# Patient Record
Sex: FEMALE | Race: ASIAN | NOT HISPANIC OR LATINO | Employment: PART TIME | URBAN - METROPOLITAN AREA
[De-identification: names, ages, dates, MRNs, and addresses within clinical notes are randomized per-mention and may not be internally consistent; named-entity substitution may affect disease eponyms.]

---

## 2017-01-06 ENCOUNTER — OFFICE VISIT (OUTPATIENT)
Dept: INTERNAL MEDICINE | Facility: CLINIC | Age: 47
End: 2017-01-06

## 2017-01-06 ENCOUNTER — APPOINTMENT (OUTPATIENT)
Dept: WOMENS IMAGING | Facility: HOSPITAL | Age: 47
End: 2017-01-06

## 2017-01-06 VITALS
DIASTOLIC BLOOD PRESSURE: 72 MMHG | HEIGHT: 62 IN | SYSTOLIC BLOOD PRESSURE: 114 MMHG | WEIGHT: 198.8 LBS | BODY MASS INDEX: 36.58 KG/M2 | HEART RATE: 96 BPM

## 2017-01-06 DIAGNOSIS — J20.9 ACUTE BRONCHITIS, UNSPECIFIED ORGANISM: ICD-10-CM

## 2017-01-06 DIAGNOSIS — M54.50 RIGHT-SIDED LOW BACK PAIN WITHOUT SCIATICA, UNSPECIFIED CHRONICITY: Primary | ICD-10-CM

## 2017-01-06 DIAGNOSIS — K64.8 OTHER HEMORRHOIDS: ICD-10-CM

## 2017-01-06 PROCEDURE — 72110 X-RAY EXAM L-2 SPINE 4/>VWS: CPT | Performed by: INTERNAL MEDICINE

## 2017-01-06 PROCEDURE — 72110 X-RAY EXAM L-2 SPINE 4/>VWS: CPT | Performed by: RADIOLOGY

## 2017-01-06 PROCEDURE — 99215 OFFICE O/P EST HI 40 MIN: CPT | Performed by: INTERNAL MEDICINE

## 2017-01-06 RX ORDER — AZITHROMYCIN 250 MG/1
TABLET, FILM COATED ORAL
Qty: 6 TABLET | Refills: 0 | Status: SHIPPED | OUTPATIENT
Start: 2017-01-06 | End: 2017-03-03

## 2017-01-06 RX ORDER — BENZONATATE 200 MG/1
200 CAPSULE ORAL 3 TIMES DAILY PRN
Qty: 30 CAPSULE | Refills: 0 | Status: SHIPPED | OUTPATIENT
Start: 2017-01-06 | End: 2017-03-03

## 2017-01-06 RX ORDER — HYDROCORTISONE ACETATE 25 MG/1
25 SUPPOSITORY RECTAL 2 TIMES DAILY
Qty: 12 SUPPOSITORY | Refills: 2 | Status: SHIPPED | OUTPATIENT
Start: 2017-01-06 | End: 2017-03-03

## 2017-01-06 NOTE — MR AVS SNAPSHOT
Adria Nogueira   1/6/2017 2:30 PM   Office Visit    Dept Phone:  [phone]   Encounter #:  [id number]    Provider:  Ludmila King MD   Department:  [facility] INTERNAL MEDICINE                Your Full Care Plan              Today's Medication Changes          These changes are accurate as of: 1/6/17  4:04 PM.  If you have any questions, ask your nurse or doctor.               New Medication(s)Ordered:     hydrocortisone 25 MG suppository   Commonly known as:  ANUSOL-HC   Insert 1 suppository into the rectum 2 (Two) Times a Day.   Started by:  Ludmila King MD         Stop taking medication(s)listed here:     hydrocortisone-pramoxine 1-1 % rectal foam   Commonly known as:  PROCTOFOAM-HS   Stopped by:  Ludmila King MD           PROAIR RESPICLICK 108 (90 BASE) MCG/ACT inhaler   Generic drug:  albuterol   Stopped by:  Ludmila King MD                Where to Get Your Medications      These [facility] [address] [facility] [phone] [facility] [phone]   [address] [facility] , [place] [address]     Phone:  [phone]     azithromycin 250 MG tablet    benzonatate 200 MG capsule    hydrocortisone 25 MG suppository                  Your Updated Medication List          This list is accurate as of: 1/6/17  4:04 PM.  Always use your most recent med list.                acetaminophen 100 MG/ML solution   Commonly known as:  TYLENOL       azithromycin 250 MG tablet   Commonly known as:  ZITHROMAX Z-NAM   Take 2 tablets the first day, then 1 tablet daily for 4 days.       benzonatate 200 MG capsule   Commonly known as:  TESSALON   Take 1 capsule by mouth 3 (Three) Times a Day As Needed for cough.       hydrocortisone 25 MG suppository   Commonly known as:  ANUSOL-HC   Insert 1 suppository into the rectum 2 (Two) Times a Day.               We Performed the Following     Ambulatory Referral to  Colorectal Surgery     Ambulatory Referral to Physical Therapy Evaluate and treat     XR Spine Lumbar 4+ View (In Office)       You Were Diagnosed With        Codes Comments    Right-sided low back pain without sciatica, unspecified chronicity    -  Primary ICD-10-CM: M54.5  ICD-9-CM: 724.2     Other hemorrhoids     ICD-10-CM: K64.8  ICD-9-CM: 455.6     Acute bronchitis, unspecified organism     ICD-10-CM: J20.9  ICD-9-CM: 466.0       Instructions     None    Patient Instructions History      Upcoming Appointments     Visit Type Date Time Department    NEW PATIENT 2017  2:30 PM [facility]    FOLLOW UP 2017  2:45 PM [facility] Signup     [employer] allows you to send messages to your doctor, view your test results, renew your prescriptions, schedule appointments, and more. To sign up, go to [employer] and click on the Sign Up Now link in the New User? box. Enter your WhoKnows Activation Code exactly as it appears below along with the last four digits of your Social Security Number and your Date of Birth () to complete the sign-up process. If you do not sign up before the expiration date, you must request a new code.    WhoKnows Activation Code: [email or web address]  Expires: 2017  4:04 PM    If you have questions, you can email Home Delivery Service [email or web address] or call [phone] to talk to our WhoKnows staff. Remember, WhoKnows is NOT to be used for urgent needs. For medical emergencies, dial 911.               Other Info from Your Visit           Your Appointments     2017  2:45 PM EST   Follow Up with Ludmila King MD   [facility] (--)    [address]  [facility] [address] [phone]           Arrive 15 minutes prior to appointment.              Allergies     Aspirin  Nausea Only    Ibuprofen  Nausea Only      Reason for Visit     Establish Care est care      Vital Signs     Blood Pressure Pulse  "Height Weight Last Menstrual Period Body Mass Index    114/72 96 62\" (157.5 cm) 198 lb 12.8 oz (90.2 kg) 12/27/2016 36.36 kg/m2    Smoking Status                   Never Smoker           Problems and Diagnoses Noted     Low back pain    -  Primary    Other hemorrhoids        Acute bronchitis          Results     XR Spine Lumbar 4+ View (In Office)               "

## 2017-01-06 NOTE — PROGRESS NOTES
Chief Complaint   Patient presents with   • Establish Care     est care        Subjective   Adria Nogueira is a 46 y.o. female who is here to establish care.  She has several problems she would like to address.  This includes back pain, cough and hemorrhoids.    HPI: back pain:She has been having problems with her back aching for about 3-4 months.  It's located in the right side of her low back.  One day it was so severe that she couldn't walk and it made her cry.  She does not recall any precipitating event.  She cannot think of any specific aggravating factors or relieving factors.  Her back pain has awakened her from sleep at night.  She denies fevers, no rashes no urinary symptoms, no problems with bowel movements.  She does not think she's had weakness in her legs.     Cough: This is been going on for several days. It is nonproductive.  She feels some congestion in her chest and nasal passages.  Her voice is different.  She does not have a fever associated with this.  No loss of appetite.  No nausea, vomiting or diarrhea.  No earache.  No sore throat.  No sinus pain.    Hemorrhoids: She has ongoing problems with hemorrhoids. It hurts and burns in the rectal area.  They bleed at times.  She has problems inserting Proctofoam because of the discomfort. She also has history of anal fissure.      The following portions of the patient's history were reviewed and updated as appropriate: allergies, current medications, past social history, problem list, past surgical history    Review of Systems   Constitutional: Negative for appetite change, chills, fatigue and fever.   HENT: Positive for congestion. Negative for ear pain, sinus pressure, sneezing, sore throat, tinnitus, trouble swallowing and voice change.    Eyes: Negative for visual disturbance.   Respiratory: Positive for cough. Negative for shortness of breath.    Cardiovascular: Negative for chest pain, palpitations and leg swelling.   Gastrointestinal:  "Positive for anal bleeding and rectal pain. Negative for abdominal pain, constipation, diarrhea, nausea and vomiting.   Endocrine: Negative for cold intolerance, heat intolerance, polydipsia and polyuria.   Genitourinary: Negative for dysuria and frequency.   Musculoskeletal: Positive for back pain and gait problem. Negative for arthralgias.   Skin: Negative for rash.   Neurological: Negative for dizziness, syncope and headaches.   Hematological: Negative for adenopathy. Does not bruise/bleed easily.   Psychiatric/Behavioral: Negative for decreased concentration, dysphoric mood and sleep disturbance. The patient is not nervous/anxious.        Visit Vitals   • /72   • Pulse 96   • Ht 62\" (157.5 cm)   • Wt 198 lb 12.8 oz (90.2 kg)   • LMP 12/27/2016   • BMI 36.36 kg/m2        Objective   Physical Exam   Constitutional: She is oriented to person, place, and time. She appears well-developed and well-nourished. No distress.   HENT:   Right Ear: Tympanic membrane and ear canal normal.   Left Ear: Tympanic membrane and ear canal normal.   Nose: Right sinus exhibits no maxillary sinus tenderness and no frontal sinus tenderness. Left sinus exhibits no maxillary sinus tenderness and no frontal sinus tenderness.   Mouth/Throat: Oropharynx is clear and moist. No oropharyngeal exudate or posterior oropharyngeal edema.   Eyes: Conjunctivae are normal.   Neck: Neck supple. Normal carotid pulses present. Carotid bruit is not present.   Cardiovascular: Normal rate, regular rhythm, S1 normal, S2 normal and intact distal pulses.  Exam reveals no gallop and no friction rub.    No murmur heard.  Pulses:       Carotid pulses are 2+ on the right side, and 2+ on the left side.  Pulmonary/Chest: Effort normal and breath sounds normal. No respiratory distress. She has no wheezes. She has no rales. Chest wall is not dull to percussion.   Dry cough present   Genitourinary:   Genitourinary Comments: External hemorrhoids present.  Rectal " exam attempted but was very uncomfortable for her and not completed.   Musculoskeletal: She exhibits no edema.   There is no pain to palpation of her lumbar spine.  Straight leg raising of the right leg brings out some discomfort in her right low back.  There is no pain with straight leg raising of the left leg.  Patellar reflexes are 1+ and equal.  Lower extremity strength 5 over 5 bilaterally.   Lymphadenopathy:     She has no cervical adenopathy.   Neurological: She is alert and oriented to person, place, and time.   Skin: Skin is warm and dry.   Nursing note and vitals reviewed.      Assessment/Plan   Problem List Items Addressed This Visit     None      Visit Diagnoses     Right-sided low back pain without sciatica, unspecified chronicity    -  Primary    Relevant Orders    XR Spine Lumbar 4+ View (In Office) (Completed)    Ambulatory Referral to Physical Therapy Evaluate and treat    Other hemorrhoids        Relevant Medications    hydrocortisone (ANUSOL-HC) 25 MG suppository    Other Relevant Orders    Ambulatory Referral to Colorectal Surgery    Acute bronchitis, unspecified organism        Relevant Medications    azithromycin (ZITHROMAX Z-NAM) 250 MG tablet    benzonatate (TESSALON) 200 MG capsule      40-45 minutes spent with her.  For back pain,  She will have x-rays today and will try physical therapy.  She may also take Tylenol or over-the-counter NSAID.  For hemorrhoids and rectal pain, we are going to try Anusol HC Suppository.  Also referral to colorectal surgery. For acute bronchitis, she will take azithromycin.  Also benzonatate as needed.  We discussed using an inhaler.  She does not think she needs an inhaler.  Return in 6 weeks for follow-up, check fasting blood work at that time.

## 2017-03-03 ENCOUNTER — OFFICE VISIT (OUTPATIENT)
Dept: INTERNAL MEDICINE | Facility: CLINIC | Age: 47
End: 2017-03-03

## 2017-03-03 VITALS
SYSTOLIC BLOOD PRESSURE: 112 MMHG | DIASTOLIC BLOOD PRESSURE: 70 MMHG | HEIGHT: 62 IN | RESPIRATION RATE: 14 BRPM | WEIGHT: 197 LBS | BODY MASS INDEX: 36.25 KG/M2

## 2017-03-03 DIAGNOSIS — M79.602 LEFT ARM PAIN: ICD-10-CM

## 2017-03-03 DIAGNOSIS — R07.89 CHEST DISCOMFORT: ICD-10-CM

## 2017-03-03 DIAGNOSIS — E78.5 HYPERLIPIDEMIA, UNSPECIFIED HYPERLIPIDEMIA TYPE: Primary | ICD-10-CM

## 2017-03-03 LAB
BACTERIA UR QL AUTO: ABNORMAL /HPF
BASOPHILS # BLD AUTO: 0.03 10*3/MM3 (ref 0–0.2)
BASOPHILS NFR BLD AUTO: 0.3 % (ref 0–1.5)
BILIRUB UR QL STRIP: NEGATIVE
CLARITY UR: CLEAR
COLOR UR: YELLOW
EOSINOPHIL # BLD AUTO: 0.12 10*3/MM3 (ref 0–0.7)
EOSINOPHIL # BLD AUTO: 1.3 % (ref 0.3–6.2)
ERYTHROCYTE [DISTWIDTH] IN BLOOD BY AUTOMATED COUNT: 16 % (ref 11.7–13)
GLUCOSE UR STRIP-MCNC: NEGATIVE MG/DL
HCT VFR BLD AUTO: 34.9 % (ref 35.6–45.5)
HGB BLD-MCNC: 11.1 G/DL (ref 11.9–15.5)
HGB UR QL STRIP.AUTO: ABNORMAL
HYALINE CASTS UR QL AUTO: ABNORMAL /LPF
IMM GRANULOCYTES # BLD: 0 10*3/MM3 (ref 0–0.03)
IMM GRANULOCYTES NFR BLD: 0 % (ref 0–0.5)
KETONES UR QL STRIP: NEGATIVE
LEUKOCYTE ESTERASE UR QL STRIP.AUTO: NEGATIVE
LYMPHOCYTES # BLD AUTO: 3.18 10*3/MM3 (ref 0.9–4.8)
LYMPHOCYTES NFR BLD AUTO: 35.3 % (ref 19.6–45.3)
MCH RBC QN AUTO: 26.6 PG (ref 26.9–32)
MCHC RBC AUTO-ENTMCNC: 31.8 G/DL (ref 32.4–36.3)
MCV RBC AUTO: 83.5 FL (ref 80.5–98.2)
MONOCYTES # BLD AUTO: 0.75 10*3/MM3 (ref 0.2–1.2)
MONOCYTES NFR BLD AUTO: 8.3 % (ref 5–12)
NEUTROPHILS # BLD AUTO: 4.94 10*3/MM3 (ref 1.9–8.1)
NEUTROPHILS NFR BLD AUTO: 54.8 % (ref 42.7–76)
NITRITE UR QL STRIP: NEGATIVE
PH UR STRIP.AUTO: 7 [PH] (ref 5–8)
PLATELET # BLD AUTO: 323 10*3/MM3 (ref 140–500)
PROT UR QL STRIP: NEGATIVE
RBC # BLD AUTO: 4.18 10*6/MM3 (ref 3.9–5.2)
RBC # UR: ABNORMAL /HPF
REF LAB TEST METHOD: ABNORMAL
SP GR UR STRIP: 1.02 (ref 1–1.03)
SQUAMOUS #/AREA URNS HPF: ABNORMAL /HPF
UROBILINOGEN UR QL STRIP: ABNORMAL
WBC # BLD AUTO: 9.02 10*3/MM3 (ref 4.5–10.7)
WBC UR QL AUTO: ABNORMAL /HPF

## 2017-03-03 PROCEDURE — 81001 URINALYSIS AUTO W/SCOPE: CPT | Performed by: INTERNAL MEDICINE

## 2017-03-03 PROCEDURE — 99214 OFFICE O/P EST MOD 30 MIN: CPT | Performed by: INTERNAL MEDICINE

## 2017-03-03 PROCEDURE — 80061 LIPID PANEL: CPT | Performed by: INTERNAL MEDICINE

## 2017-03-03 PROCEDURE — 93000 ELECTROCARDIOGRAM COMPLETE: CPT | Performed by: INTERNAL MEDICINE

## 2017-03-03 PROCEDURE — 80053 COMPREHEN METABOLIC PANEL: CPT | Performed by: INTERNAL MEDICINE

## 2017-03-03 NOTE — PROGRESS NOTES
"Chief Complaint   Patient presents with   • Back Pain     6 week follow up         Subjective   Adria Nogueira is a 46 y.o. female     HPI: She comes in for follow-up of back pain.  She had an x-ray which showed mild arthritis.  She is not having problems so much with her back.  She is having problems with her neck and left arm.  She is somewhat vague as to when her symptoms first began.  However she states that for about 10 days to 2 weeks she had pain starting from her left neck area going down her arm to her fingers.  This kind like it achy pain.  She did not feel well with it.  There is some discomfort in her left upper chest.  She describes some shortness of breath.  She states that it was hard to use her left arm because it hurt to raise it.  However now, she does not have constant pain.  She does not recall any specific precipitating, aggravating or relieving factors.  She states that she was able to continue her daily activities despite the pain.          She used anusol suppositories for rectal pain.  It has resolved. She is following a high fiber diet.     The following portions of the patient's history were reviewed and updated as appropriate: allergies, current medications, past social history, problem list, past surgical history    Review of Systems   Constitutional: Negative for activity change and appetite change.   HENT: Negative for nosebleeds.    Eyes: Negative for visual disturbance.   Respiratory: Positive for shortness of breath. Negative for cough.    Cardiovascular: Positive for chest pain. Negative for palpitations and leg swelling.        Left arm pain, chest pain   Neurological: Negative for headaches.       Visit Vitals   • /70 (BP Location: Right arm, Patient Position: Sitting, Cuff Size: Adult)   • Resp 14   • Ht 62\" (157.5 cm)   • Wt 197 lb (89.4 kg)   • BMI 36.03 kg/m2        Objective    Physical Exam   Constitutional: She is oriented to person, place, and time. She appears " well-developed and well-nourished. No distress.   Neck: Normal carotid pulses and no JVD present. Carotid bruit is not present.   Cardiovascular: Normal rate, regular rhythm, S1 normal, S2 normal and intact distal pulses.  Exam reveals no gallop and no friction rub.    No murmur heard.  Pulses:       Carotid pulses are 2+ on the right side, and 2+ on the left side.  Pulmonary/Chest: Effort normal and breath sounds normal. No respiratory distress. She has no wheezes. She has no rhonchi. She has no rales. Chest wall is not dull to percussion.   Musculoskeletal: She exhibits no edema.   Neck flexion and extension causes some discomfort in her left neck.  Range of motion of her left shoulder is somewhat limited.  She finds it difficult touch the small of her back.   Neurological: She is alert and oriented to person, place, and time.   Skin: Skin is warm and dry.   Nursing note and vitals reviewed.      ECG 12 Lead  Date/Time: 3/3/2017 4:32 PM  Performed by: LIZBETH LAND  Authorized by: PHILIPPE SOSA   Previous ECG: no previous ECG available  Rhythm: sinus rhythm  Rate: normal  BPM: 71  Conduction: conduction normal  ST Segments: ST segments normal  T Waves: T waves normal  Other findings comments: Low QRS voltages precordial leads  Clinical impression: non-specific ECG            Assessment/Plan   Problem List Items Addressed This Visit        Cardiovascular and Mediastinum    HLD (hyperlipidemia) - Primary    Relevant Orders    Comprehensive Metabolic Panel    CBC & Differential    Urinalysis With / Microscopic If Indicated (Completed)    Lipid Panel    CBC Auto Differential    Urinalysis, Microscopic Only       Nervous and Auditory    Chest discomfort    Relevant Orders    Treadmill Stress Test      Other Visit Diagnoses     Left arm pain        Relevant Orders    Treadmill Stress Test        We discussed her left arm pain.  With also symptoms of chest pain and shortness of breath, I have recommended she do a  treadmill stress test.  If negative, we can pursue physical therapy for her neck and left arm discomfort.  She has mild arthritis in her lumbar spine on plain x-rays and she may also have arthritis in her neck.

## 2017-03-06 LAB
ALBUMIN SERPL-MCNC: 3.64 G/DL (ref 3.4–4.6)
ALBUMIN/GLOB SERPL: 1.1 G/DL
ALP SERPL-CCNC: 71 U/L (ref 46–116)
ALT SERPL W P-5'-P-CCNC: 27 U/L (ref 14–59)
ANION GAP SERPL CALCULATED.3IONS-SCNC: 7 MMOL/L
AST SERPL-CCNC: 21 U/L (ref 7–37)
BILIRUB SERPL-MCNC: 0.1 MG/DL (ref 0.2–1)
BUN BLD-MCNC: 13 MG/DL (ref 6–22)
BUN/CREAT SERPL: 18.6 (ref 7–25)
CALCIUM SPEC-SCNC: 8.7 MG/DL (ref 8.6–10.5)
CHLORIDE SERPL-SCNC: 103 MMOL/L (ref 95–107)
CHOLEST SERPL-MCNC: 240 MG/DL (ref 0–200)
CO2 SERPL-SCNC: 30 MMOL/L (ref 23–32)
CREAT BLD-MCNC: 0.7 MG/DL (ref 0.55–1.02)
GFR SERPL CREATININE-BSD FRML MDRD: 108 ML/MIN/1.73
GFR SERPL CREATININE-BSD FRML MDRD: 89 ML/MIN/1.73
GLOBULIN UR ELPH-MCNC: 3.5 GM/DL
GLUCOSE BLD-MCNC: 89 MG/DL (ref 70–100)
HDLC SERPL-MCNC: 57 MG/DL (ref 40–81)
LDLC SERPL CALC-MCNC: 154 MG/DL (ref 0–100)
LDLC/HDLC SERPL: 2.71 {RATIO}
POTASSIUM BLD-SCNC: 4.1 MMOL/L (ref 3.3–5.3)
PROT SERPL-MCNC: 7.1 G/DL (ref 6.3–8.4)
SODIUM BLD-SCNC: 140 MMOL/L (ref 136–145)
TRIGL SERPL-MCNC: 143 MG/DL (ref 0–150)
VLDLC SERPL-MCNC: 28.6 MG/DL

## 2017-03-07 ENCOUNTER — LAB (OUTPATIENT)
Dept: INTERNAL MEDICINE | Facility: CLINIC | Age: 47
End: 2017-03-07

## 2017-03-07 ENCOUNTER — TELEPHONE (OUTPATIENT)
Dept: INTERNAL MEDICINE | Facility: CLINIC | Age: 47
End: 2017-03-07

## 2017-03-07 DIAGNOSIS — D64.9 ANEMIA, UNSPECIFIED: Primary | ICD-10-CM

## 2017-03-07 NOTE — TELEPHONE ENCOUNTER
----- Message from Ludmila King MD sent at 3/6/2017  6:03 PM EST -----  Regarding: add lab?  Can ferritin, iron profile, B12 and folate be added to recent lab?  Diagnosis is anemia

## 2017-03-08 LAB
FERRITIN SERPL-MCNC: 7 NG/ML (ref 15–150)
FOLATE SERPL-MCNC: 16.1 NG/ML
IRON SATN MFR SERPL: 6 % (ref 15–55)
IRON SERPL-MCNC: 26 UG/DL (ref 27–159)
LOPINAVIR ISLT PHENOTYP: 432 UG/DL (ref 131–425)
TIBC SERPL-MCNC: 458 UG/DL (ref 250–450)
VIT B12 SERPL-MCNC: 295 PG/ML (ref 211–946)

## 2017-12-28 ENCOUNTER — OFFICE VISIT (OUTPATIENT)
Dept: INTERNAL MEDICINE | Facility: CLINIC | Age: 47
End: 2017-12-28

## 2017-12-28 VITALS
WEIGHT: 192 LBS | TEMPERATURE: 98.7 F | DIASTOLIC BLOOD PRESSURE: 98 MMHG | BODY MASS INDEX: 35.33 KG/M2 | HEIGHT: 62 IN | SYSTOLIC BLOOD PRESSURE: 148 MMHG

## 2017-12-28 DIAGNOSIS — R68.89 FLU-LIKE SYMPTOMS: Primary | ICD-10-CM

## 2017-12-28 LAB
EXPIRATION DATE: ABNORMAL
FLUAV AG NPH QL: NEGATIVE
FLUBV AG NPH QL: POSITIVE
INTERNAL CONTROL: ABNORMAL
Lab: ABNORMAL

## 2017-12-28 PROCEDURE — 99213 OFFICE O/P EST LOW 20 MIN: CPT | Performed by: INTERNAL MEDICINE

## 2017-12-28 PROCEDURE — 87804 INFLUENZA ASSAY W/OPTIC: CPT | Performed by: INTERNAL MEDICINE

## 2017-12-28 RX ORDER — OSELTAMIVIR PHOSPHATE 75 MG/1
75 CAPSULE ORAL 2 TIMES DAILY
Qty: 10 CAPSULE | Refills: 0 | Status: SHIPPED | OUTPATIENT
Start: 2017-12-28 | End: 2018-08-17

## 2017-12-28 RX ORDER — DEXTROMETHORPHAN HYDROBROMIDE AND PROMETHAZINE HYDROCHLORIDE 15; 6.25 MG/5ML; MG/5ML
5 SYRUP ORAL 4 TIMES DAILY PRN
Qty: 180 ML | Refills: 1 | Status: SHIPPED | OUTPATIENT
Start: 2017-12-28 | End: 2018-08-17

## 2017-12-28 NOTE — PATIENT INSTRUCTIONS
"Influenza, Adult  Influenza, more commonly known as \"the flu,\" is a viral infection that primarily affects the respiratory tract. The respiratory tract includes organs that help you breathe, such as the lungs, nose, and throat. The flu causes many common cold symptoms, as well as a high fever and body aches.  The flu spreads easily from person to person (is contagious). Getting a flu shot (influenza vaccination) every year is the best way to prevent influenza.  CAUSES  Influenza is caused by a virus. You can catch the virus by:  · Breathing in droplets from an infected person's cough or sneeze.  · Touching something that was recently contaminated with the virus and then touching your mouth, nose, or eyes.  RISK FACTORS  The following factors may make you more likely to get the flu:  · Not cleaning your hands frequently with soap and water or alcohol-based hand .  · Having close contact with many people during cold and flu season.  · Touching your mouth, eyes, or nose without washing or sanitizing your hands first.  · Not drinking enough fluids or not eating a healthy diet.  · Not getting enough sleep or exercise.  · Being under a high amount of stress.  · Not getting a yearly (annual) flu shot.  You may be at a higher risk of complications from the flu, such as a severe lung infection (pneumonia), if you:  · Are over the age of 65.  · Are pregnant.  · Have a weakened disease-fighting system (immune system). You may have a weakened immune system if you:    Have HIV or AIDS.    Are undergoing chemotherapy.    Are taking medicines that reduce the activity of (suppress) the immune system.  · Have a long-term (chronic) illness, such as heart disease, kidney disease, diabetes, or lung disease.  · Have a liver disorder.  · Are obese.  · Have anemia.  SYMPTOMS  Symptoms of this condition typically last 4-10 days and may include:  · Fever.  · Chills.  · Headache, body aches, or muscle aches.  · Sore " throat.  · Cough.  · Runny or congested nose.  · Chest discomfort and cough.  · Poor appetite.  · Weakness or tiredness (fatigue).  · Dizziness.  · Nausea or vomiting.  DIAGNOSIS  This condition may be diagnosed based on your medical history and a physical exam. Your health care provider may do a nose or throat swab test to confirm the diagnosis.  TREATMENT  If influenza is detected early, you can be treated with antiviral medicine that can reduce the length of your illness and the severity of your symptoms. This medicine may be given by mouth (orally) or through an IV tube that is inserted in one of your veins.  The goal of treatment is to relieve symptoms by taking care of yourself at home. This may include taking over-the-counter medicines, drinking plenty of fluids, and adding humidity to the air in your home.  In some cases, influenza goes away on its own. Severe influenza or complications from influenza may be treated in a hospital.  HOME CARE INSTRUCTIONS  · Take over-the-counter and prescription medicines only as told by your health care provider.  · Use a cool mist humidifier to add humidity to the air in your home. This can make breathing easier.  · Rest as needed.  · Drink enough fluid to keep your urine clear or pale yellow.  · Cover your mouth and nose when you cough or sneeze.  · Wash your hands with soap and water often, especially after you cough or sneeze. If soap and water are not available, use hand .  · Stay home from work or school as told by your health care provider. Unless you are visiting your health care provider, try to avoid leaving home until your fever has been gone for 24 hours without the use of medicine.  · Keep all follow-up visits as told by your health care provider. This is important.  PREVENTION  · Getting an annual flu shot is the best way to avoid getting the flu. You may get the flu shot in late summer, fall, or winter. Ask your health care provider when you should  get your flu shot.  · Wash your hands often or use hand  often.  · Avoid contact with people who are sick during cold and flu season.  · Eat a healthy diet, drink plenty of fluids, get enough sleep, and exercise regularly.  SEEK MEDICAL CARE IF:  · You develop new symptoms.  · You have:    Chest pain.    Diarrhea.    A fever.  · Your cough gets worse.  · You produce more mucus.  · You feel nauseous or you vomit.  SEEK IMMEDIATE MEDICAL CARE IF:  · You develop shortness of breath or difficulty breathing.  · Your skin or nails turn a bluish color.  · You have severe pain or stiffness in your neck.  · You develop a sudden headache or sudden pain in your face or ear.  · You cannot stop vomiting.     This information is not intended to replace advice given to you by your health care provider. Make sure you discuss any questions you have with your health care provider.     Document Released: 12/15/2001 Document Revised: 04/10/2017 Document Reviewed: 10/11/2016  [email or web address] Interactive Patient Education ©2017 [employer]

## 2017-12-28 NOTE — PROGRESS NOTES
"Chief Complaint   Patient presents with   • Cough     cough, body aches   • Fever   • Headache        Subjective   Adria Nogueira is a 47 y.o. female     HPI:   She comes in for evaluation of respiratory symptoms. These began acutely. She has had them for two days.  They are persistent and seem to be worsening.  She has a cough, body aches, she has felt feverish. Her throat hurts. She has not had sputum production, earache, nasal congestion although she does have a runny nose. Her sinuses do not hurt. Associated symptoms: loss of appetite.         The following portions of the patient's history were reviewed and updated as appropriate: allergies, current medications, past social history, problem list, past surgical history    Review of Systems   Constitutional: Positive for appetite change, chills, fatigue and fever. Negative for diaphoresis.   Respiratory:        Chest burns and hurts when she coughs   Gastrointestinal: Positive for nausea. Negative for diarrhea and vomiting.   Neurological: Positive for headaches.   Hematological: Negative for adenopathy.           Objective     /98  Temp 98.7 °F (37.1 °C)  Ht 157.5 cm (62\")  Wt 87.1 kg (192 lb)  BMI 35.12 kg/m2     Physical Exam   Constitutional: She appears well-developed and well-nourished.   Appears ill   HENT:   Right Ear: Tympanic membrane normal.   Left Ear: Tympanic membrane is retracted. Tympanic membrane is not erythematous.   Nose: Right sinus exhibits no maxillary sinus tenderness and no frontal sinus tenderness. Left sinus exhibits no maxillary sinus tenderness and no frontal sinus tenderness.   Mouth/Throat: Oropharynx is clear and moist.   Cardiovascular: Normal rate, regular rhythm, S1 normal and S2 normal.  Exam reveals no gallop and no friction rub.    No murmur heard.  Pulmonary/Chest: Effort normal and breath sounds normal. She has no wheezes. She has no rhonchi. She has no rales.       Assessment/Plan   Problem List Items Addressed " This Visit     None      Visit Diagnoses     Flu-like symptoms    -  Primary    Relevant Orders    POCT Influenza A/B        She tests positive for influenza B.  Will treat with Tamiflu, promethazine dm.

## 2018-08-17 ENCOUNTER — APPOINTMENT (OUTPATIENT)
Dept: WOMENS IMAGING | Facility: HOSPITAL | Age: 48
End: 2018-08-17

## 2018-08-17 ENCOUNTER — OFFICE VISIT (OUTPATIENT)
Dept: INTERNAL MEDICINE | Facility: CLINIC | Age: 48
End: 2018-08-17

## 2018-08-17 VITALS
WEIGHT: 199 LBS | BODY MASS INDEX: 36.62 KG/M2 | HEIGHT: 62 IN | DIASTOLIC BLOOD PRESSURE: 66 MMHG | SYSTOLIC BLOOD PRESSURE: 104 MMHG

## 2018-08-17 DIAGNOSIS — M25.571 RIGHT ANKLE PAIN, UNSPECIFIED CHRONICITY: Primary | ICD-10-CM

## 2018-08-17 DIAGNOSIS — R31.29 MICROSCOPIC HEMATURIA: ICD-10-CM

## 2018-08-17 DIAGNOSIS — M25.512 LEFT SHOULDER PAIN, UNSPECIFIED CHRONICITY: ICD-10-CM

## 2018-08-17 PROCEDURE — 73030 X-RAY EXAM OF SHOULDER: CPT | Performed by: RADIOLOGY

## 2018-08-17 PROCEDURE — 99213 OFFICE O/P EST LOW 20 MIN: CPT | Performed by: INTERNAL MEDICINE

## 2018-08-17 PROCEDURE — 73610 X-RAY EXAM OF ANKLE: CPT | Performed by: RADIOLOGY

## 2018-08-17 PROCEDURE — 73610 X-RAY EXAM OF ANKLE: CPT | Performed by: INTERNAL MEDICINE

## 2018-08-17 PROCEDURE — 73030 X-RAY EXAM OF SHOULDER: CPT | Performed by: INTERNAL MEDICINE

## 2018-08-17 NOTE — PROGRESS NOTES
Chief Complaint   Patient presents with   • Leg Pain     lower back down right leg shooting pains at times   • Shoulder Pain     left shoulder pain   • Ankle Pain     right ankle pain       Subjective   Adria Nogueira is a 48 y.o. female.     History of Present Illness       Left shoulder pain: This is a new problem.  About one month ago she slipped and fell in the kitchen.  She hit her left shoulder in the fall.  She continues to have some discomfort in the shoulder.  It hurts to lie down on it at night.  She has more discomfort in the morning when she first gets up.  It gets a little bit better during the day.  She has taken Tylenol and ibuprofen.  Certain motions of her left shoulder aggravate the pain.    Right leg pain:  This is a chronic problem.   It started a few years ago.  It has been worse recently. She went to urgent care recently and was given a course of steroids.  This was about one month ago. Starts in right low back and goes down to her ankle.  Previous low back x-ray showing degenerative changes.  The steroid may have helped a little bit but she still has some discomfort.  She states that the pain starts in the right buttock area and goes down her leg to the ankle.    She also has pain in her right ankle that seems to be separate from the right leg pain even though the right leg pain goes down her leg to the ankle.  It hurts to push on the back of her ankle over the Achilles area.  She has not noticed any trouble with flexion and extension of her ankle.    Review of her record shows that she had microscopic hematuria about one year ago.  This has not been rechecked.    The following portions of the patient's history were reviewed and updated as appropriate: allergies, current medications, past family history, past medical history, past social history, past surgical history and problem list.    Review of Systems   Constitutional: Negative for appetite change, fatigue and fever.   Musculoskeletal:  "Negative for gait problem.   Skin: Negative for rash.   Neurological: Negative for weakness.         Current Outpatient Prescriptions:   •  acetaminophen (TYLENOL) 100 MG/ML solution, Take 10 mg/kg by mouth every 4 (four) hours as needed for fever., Disp: , Rfl:         Objective     /66   Ht 157.5 cm (62\")   Wt 90.3 kg (199 lb)   BMI 36.40 kg/m²     Physical Exam   Constitutional: She appears well-developed and well-nourished. No distress.   Musculoskeletal:        Left shoulder: She exhibits decreased range of motion (slight decreased ability to raise arm above her head) and tenderness (over the anterior shoulder). She exhibits no swelling.        Right ankle: She exhibits normal range of motion, no swelling and no ecchymosis. Tenderness (over Achilles area).        Lumbar back: She exhibits no tenderness, no bony tenderness and no swelling.   Nursing note and vitals reviewed.        Assessment/Plan   Adria was seen today for leg pain, shoulder pain and ankle pain.    Diagnoses and all orders for this visit:    Right ankle pain, unspecified chronicity  -     XR Ankle 3+ View Right (In Office)    Left shoulder pain, unspecified chronicity  -     XR Shoulder 2+ View Left (In Office)    Microscopic hematuria  -     Comprehensive Metabolic Panel; Future  -     Urinalysis With Microscopic If Indicated (No Culture) - Urine, Clean Catch; Future     x-rays done today.  These will be sent out for radiology interpretation.  Advised her that we might try physical therapy depending on the findings.  Meanwhile, she will continue Tylenol.  Advised her she could also use Biofreeze.           "

## 2019-04-29 ENCOUNTER — OFFICE VISIT (OUTPATIENT)
Dept: FAMILY MEDICINE CLINIC | Facility: CLINIC | Age: 49
End: 2019-04-29

## 2019-04-29 VITALS
HEIGHT: 62 IN | DIASTOLIC BLOOD PRESSURE: 74 MMHG | SYSTOLIC BLOOD PRESSURE: 116 MMHG | OXYGEN SATURATION: 98 % | BODY MASS INDEX: 38.28 KG/M2 | HEART RATE: 72 BPM | WEIGHT: 208 LBS | TEMPERATURE: 98.2 F

## 2019-04-29 DIAGNOSIS — E55.9 HYPOVITAMINOSIS D: ICD-10-CM

## 2019-04-29 DIAGNOSIS — G43.909 MIGRAINE WITHOUT STATUS MIGRAINOSUS, NOT INTRACTABLE, UNSPECIFIED MIGRAINE TYPE: ICD-10-CM

## 2019-04-29 DIAGNOSIS — E78.5 HYPERLIPIDEMIA, UNSPECIFIED HYPERLIPIDEMIA TYPE: Primary | ICD-10-CM

## 2019-04-29 DIAGNOSIS — N92.6 IRREGULAR MENSTRUAL CYCLE: ICD-10-CM

## 2019-04-29 DIAGNOSIS — D50.8 IRON DEFICIENCY ANEMIA SECONDARY TO INADEQUATE DIETARY IRON INTAKE: ICD-10-CM

## 2019-04-29 DIAGNOSIS — R73.9 HYPERGLYCEMIA: ICD-10-CM

## 2019-04-29 PROCEDURE — 99214 OFFICE O/P EST MOD 30 MIN: CPT | Performed by: FAMILY MEDICINE
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
## 2019-05-01 LAB
25(OH)D3+25(OH)D2 SERPL-MCNC: 22.4 NG/ML (ref 30–100)
ALBUMIN SERPL-MCNC: 4.1 G/DL (ref 3.5–5.2)
ALBUMIN/GLOB SERPL: 1.4 G/DL
ALP SERPL-CCNC: 74 U/L (ref 39–117)
ALT SERPL-CCNC: 22 U/L (ref 1–33)
AST SERPL-CCNC: 20 U/L (ref 1–32)
BASOPHILS # BLD AUTO: 0.05 10*3/MM3 (ref 0–0.2)
BASOPHILS NFR BLD AUTO: 0.8 % (ref 0–1.5)
BILIRUB SERPL-MCNC: 0.3 MG/DL (ref 0.2–1.2)
BUN SERPL-MCNC: 15 MG/DL (ref 6–20)
BUN/CREAT SERPL: 24.6 (ref 7–25)
CALCIUM SERPL-MCNC: 9.4 MG/DL (ref 8.6–10.5)
CHLORIDE SERPL-SCNC: 102 MMOL/L (ref 98–107)
CHOLEST SERPL-MCNC: 227 MG/DL (ref 0–200)
CO2 SERPL-SCNC: 28.6 MMOL/L (ref 22–29)
CREAT SERPL-MCNC: 0.61 MG/DL (ref 0.57–1)
EOSINOPHIL # BLD AUTO: 0.11 10*3/MM3 (ref 0–0.4)
EOSINOPHIL NFR BLD AUTO: 1.7 % (ref 0.3–6.2)
ERYTHROCYTE [DISTWIDTH] IN BLOOD BY AUTOMATED COUNT: 14.6 % (ref 12.3–15.4)
GLOBULIN SER CALC-MCNC: 2.9 GM/DL
GLUCOSE SERPL-MCNC: 100 MG/DL (ref 65–99)
HBA1C MFR BLD: 5.9 % (ref 4.8–5.6)
HCT VFR BLD AUTO: 39.9 % (ref 34–46.6)
HDLC SERPL-MCNC: 54 MG/DL (ref 40–60)
HGB BLD-MCNC: 11.9 G/DL (ref 12–15.9)
IMM GRANULOCYTES # BLD AUTO: 0.02 10*3/MM3 (ref 0–0.05)
IMM GRANULOCYTES NFR BLD AUTO: 0.3 % (ref 0–0.5)
LDLC SERPL CALC-MCNC: 148 MG/DL (ref 0–100)
LYMPHOCYTES # BLD AUTO: 2.75 10*3/MM3 (ref 0.7–3.1)
LYMPHOCYTES NFR BLD AUTO: 41.4 % (ref 19.6–45.3)
MCH RBC QN AUTO: 28.1 PG (ref 26.6–33)
MCHC RBC AUTO-ENTMCNC: 29.8 G/DL (ref 31.5–35.7)
MCV RBC AUTO: 94.1 FL (ref 79–97)
MONOCYTES # BLD AUTO: 0.58 10*3/MM3 (ref 0.1–0.9)
MONOCYTES NFR BLD AUTO: 8.7 % (ref 5–12)
NEUTROPHILS # BLD AUTO: 3.13 10*3/MM3 (ref 1.7–7)
NEUTROPHILS NFR BLD AUTO: 47.1 % (ref 42.7–76)
NRBC BLD AUTO-RTO: 0 /100 WBC (ref 0–0.2)
PLATELET # BLD AUTO: 249 10*3/MM3 (ref 140–450)
POTASSIUM SERPL-SCNC: 4 MMOL/L (ref 3.5–5.2)
PROT SERPL-MCNC: 7 G/DL (ref 6–8.5)
RBC # BLD AUTO: 4.24 10*6/MM3 (ref 3.77–5.28)
SODIUM SERPL-SCNC: 140 MMOL/L (ref 136–145)
TRIGL SERPL-MCNC: 124 MG/DL (ref 0–150)
TSH SERPL DL<=0.005 MIU/L-ACNC: 3.33 MIU/ML (ref 0.27–4.2)
VLDLC SERPL CALC-MCNC: 24.8 MG/DL
WBC # BLD AUTO: 6.64 10*3/MM3 (ref 3.4–10.8)

## 2019-06-24 ENCOUNTER — TELEPHONE (OUTPATIENT)
Dept: FAMILY MEDICINE CLINIC | Facility: CLINIC | Age: 49
End: 2019-06-24

## 2019-06-24 ENCOUNTER — OFFICE VISIT (OUTPATIENT)
Dept: FAMILY MEDICINE CLINIC | Facility: CLINIC | Age: 49
End: 2019-06-24

## 2019-06-24 VITALS
HEIGHT: 62 IN | HEART RATE: 66 BPM | DIASTOLIC BLOOD PRESSURE: 62 MMHG | SYSTOLIC BLOOD PRESSURE: 106 MMHG | WEIGHT: 198 LBS | TEMPERATURE: 98.3 F | BODY MASS INDEX: 36.44 KG/M2 | OXYGEN SATURATION: 100 %

## 2019-06-24 DIAGNOSIS — S31.119A STAB WOUND OF ABDOMEN, INITIAL ENCOUNTER: Primary | ICD-10-CM

## 2019-06-24 PROCEDURE — 99214 OFFICE O/P EST MOD 30 MIN: CPT | Performed by: FAMILY MEDICINE

## 2019-06-24 RX ORDER — METAXALONE 800 MG/1
800 TABLET ORAL 3 TIMES DAILY
COMMUNITY
Start: 2019-06-22 | End: 2020-03-03

## 2019-06-24 RX ORDER — ONDANSETRON 4 MG/1
4 TABLET, ORALLY DISINTEGRATING ORAL
COMMUNITY
Start: 2019-06-22 | End: 2020-05-19

## 2019-06-24 RX ORDER — TRAMADOL HYDROCHLORIDE 50 MG/1
50 TABLET ORAL EVERY 6 HOURS PRN
Qty: 10 TABLET | Refills: 0 | OUTPATIENT
Start: 2019-06-24 | End: 2020-03-03
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
## 2019-06-28 ENCOUNTER — OFFICE VISIT (OUTPATIENT)
Dept: FAMILY MEDICINE CLINIC | Facility: CLINIC | Age: 49
End: 2019-06-28

## 2019-06-28 VITALS
DIASTOLIC BLOOD PRESSURE: 68 MMHG | TEMPERATURE: 98.5 F | HEIGHT: 62 IN | SYSTOLIC BLOOD PRESSURE: 116 MMHG | BODY MASS INDEX: 36.07 KG/M2 | OXYGEN SATURATION: 98 % | WEIGHT: 196 LBS | HEART RATE: 86 BPM

## 2019-06-28 DIAGNOSIS — S31.119S: Primary | ICD-10-CM

## 2019-06-28 DIAGNOSIS — K21.9 GASTROESOPHAGEAL REFLUX DISEASE WITHOUT ESOPHAGITIS: ICD-10-CM

## 2019-06-28 DIAGNOSIS — Z48.02 VISIT FOR SUTURE REMOVAL: ICD-10-CM

## 2019-06-28 PROCEDURE — 99213 OFFICE O/P EST LOW 20 MIN: CPT | Performed by: FAMILY MEDICINE

## 2019-06-28 RX ORDER — RANITIDINE 150 MG/1
150 CAPSULE ORAL 2 TIMES DAILY
Qty: 60 CAPSULE | Refills: 11 | Status: SHIPPED | OUTPATIENT
Start: 2019-06-28 | End: 2020-05-19
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
## 2019-07-12 ENCOUNTER — OFFICE VISIT (OUTPATIENT)
Dept: FAMILY MEDICINE CLINIC | Facility: CLINIC | Age: 49
End: 2019-07-12

## 2019-07-12 VITALS
BODY MASS INDEX: 36.07 KG/M2 | SYSTOLIC BLOOD PRESSURE: 100 MMHG | HEIGHT: 62 IN | OXYGEN SATURATION: 98 % | HEART RATE: 78 BPM | WEIGHT: 196 LBS | TEMPERATURE: 98.2 F | DIASTOLIC BLOOD PRESSURE: 62 MMHG

## 2019-07-12 DIAGNOSIS — S39.91XS ABDOMINAL TRAUMA, SEQUELA: Primary | ICD-10-CM

## 2019-07-12 DIAGNOSIS — M77.02 GOLFER'S ELBOW, LEFT: ICD-10-CM

## 2019-07-12 DIAGNOSIS — M54.41 ACUTE RIGHT-SIDED LOW BACK PAIN WITH RIGHT-SIDED SCIATICA: ICD-10-CM

## 2019-07-12 PROCEDURE — 99214 OFFICE O/P EST MOD 30 MIN: CPT | Performed by: FAMILY MEDICINE
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
## 2020-03-06 ENCOUNTER — OFFICE VISIT (OUTPATIENT)
Dept: FAMILY MEDICINE CLINIC | Facility: CLINIC | Age: 50
End: 2020-03-06

## 2020-03-06 VITALS
BODY MASS INDEX: 36.07 KG/M2 | SYSTOLIC BLOOD PRESSURE: 108 MMHG | HEART RATE: 94 BPM | DIASTOLIC BLOOD PRESSURE: 78 MMHG | WEIGHT: 196 LBS | OXYGEN SATURATION: 99 % | TEMPERATURE: 98 F | HEIGHT: 62 IN

## 2020-03-06 DIAGNOSIS — M54.41 ACUTE RIGHT-SIDED LOW BACK PAIN WITH RIGHT-SIDED SCIATICA: Primary | ICD-10-CM

## 2020-03-06 DIAGNOSIS — S46.911A STRAIN OF RIGHT SHOULDER, INITIAL ENCOUNTER: ICD-10-CM

## 2020-03-06 PROCEDURE — 99214 OFFICE O/P EST MOD 30 MIN: CPT | Performed by: FAMILY MEDICINE

## 2020-03-06 RX ORDER — CYCLOBENZAPRINE HCL 10 MG
10 TABLET ORAL 3 TIMES DAILY PRN
Qty: 30 TABLET | Refills: 0 | Status: SHIPPED | OUTPATIENT
Start: 2020-03-06 | End: 2020-03-13

## 2020-03-06 NOTE — PROGRESS NOTES
Adria Nogueira is a 49 y.o. female.     Chief Complaint   Patient presents with   • Back Pain     follow up no complains    • Shoulder Pain     3 days ago pt has right shoulder pain  when moves arm around of lift heavy objets       HPI     Pt is a pleasant 49 y.o. YO female here for chronic issues of back and shoulder pain.  She was seen in the immediate care on March 3 for acute right-sided back pain with sciaticato the posterior r knee and was treated with a Medrol Dosepak, muscle relaxer and anti-inflammatories.  She has been improving - she has been on meds for 3 days and she feels more improvement today.  She has loss of perianal numbness, no loss of bladder and bowel control.     Patient with 3 days of right shoulder pain.  It started today, she was cooking - she has family in town.  It is in the anterior R shoulder, it is a sharp pain with movement but has improved over the last 3 hours.  It impacts her ability to move heavier objects or bear weight with her right arm.    The following portions of the patient's history were reviewed and updated as appropriate: allergies, current medications, past family history, past medical history, past social history, past surgical history and problem list.    Review of Systems   Constitutional: Negative.    HENT: Negative.    Eyes: Negative.    Respiratory: Negative.    Cardiovascular: Negative for chest pain, palpitations and leg swelling.   Endocrine: Negative.    Musculoskeletal: Positive for arthralgias, back pain and myalgias.   Allergic/Immunologic: Negative.    Neurological: Negative.    Hematological: Negative.    Psychiatric/Behavioral: Negative.        Objective  Vitals:    03/06/20 1401   BP: 108/78   Pulse: 94   Temp: 98 °F (36.7 °C)   SpO2: 99%        Physical Exam   Constitutional: She is oriented to person, place, and time. She appears well-developed and well-nourished. No distress.   HENT:   Head: Normocephalic.   Nose: Nose normal.   Eyes: EOM are  normal.   Cardiovascular: Normal rate, regular rhythm, normal heart sounds and intact distal pulses.   No murmur heard.  Pulmonary/Chest: Effort normal and breath sounds normal. No respiratory distress.   Musculoskeletal: Normal range of motion.   No tenderness along the lumbar spine, minimal tenderness along the paraspinal muscles bilaterally.  Negative leg raise bilaterally.  Neurovascularly intact.   Neurological: She is alert and oriented to person, place, and time.   Skin: Skin is warm and dry. No rash noted.   Psychiatric: She has a normal mood and affect. Her behavior is normal. Judgment and thought content normal.   Nursing note and vitals reviewed.        Current Outpatient Medications:   •  cyclobenzaprine (FLEXERIL) 10 MG tablet, Take 1 tablet by mouth 3 (Three) Times a Day As Needed for Muscle Spasms., Disp: 30 tablet, Rfl: 0  •  methylPREDNISolone (MEDROL) 4 MG tablet, Take as directed on package instructions., Disp: 1 each, Rfl: 0  •  ondansetron ODT (ZOFRAN-ODT) 4 MG disintegrating tablet, Take 4 mg by mouth., Disp: , Rfl:   •  ranitidine (ZANTAC) 150 MG capsule, Take 1 capsule by mouth 2 (Two) Times a Day., Disp: 60 capsule, Rfl: 11  •  clotrimazole-betamethasone (LOTRISONE) 1-0.05 % cream, Apply  topically to the appropriate area as directed 2 (Two) Times a Day for 14 days., Disp: 15 g, Rfl: 0    Procedures    Lab Results (most recent)     Velvet Covington was seen today for back pain and shoulder pain.    Diagnoses and all orders for this visit:    Acute right-sided low back pain with right-sided sciatica  -     Ambulatory Referral to Physical Therapy Evaluate and treat  -     cyclobenzaprine (FLEXERIL) 10 MG tablet; Take 1 tablet by mouth 3 (Three) Times a Day As Needed for Muscle Spasms.    Strain of right shoulder, initial encounter  -     Ambulatory Referral to Physical Therapy Evaluate and treat  -     cyclobenzaprine (FLEXERIL) 10 MG tablet; Take 1 tablet by mouth 3 (Three) Times a Day  As Needed for Muscle Spasms.      Pleasant 49-year-old female with her second episode of acute right-sided back pain with right-sided radiculopathy in the last year.  She has responded well to steroids, muscle relaxers and anti-inflammatories.  Will refer to physical therapy as above.    New problem of right shoulder pain, likely related did to extra cooking with family in town for the last month.  She has occasional sharp anterior shoulder pain that is worse with use or lifting objects such as heavy pans.  I advised her to rest, ice and continue with muscle relaxer, prescription sent as above.  Follow-up if not improving in 6 weeks.      Also due for annual exam.    Return in about 6 weeks (around 4/17/2020), or if symptoms worsen or fail to improve, for Recheck back and shoulder pain .      Aura Graham MD

## 2020-03-12 ENCOUNTER — TELEPHONE (OUTPATIENT)
Dept: FAMILY MEDICINE CLINIC | Facility: CLINIC | Age: 50
End: 2020-03-12

## 2020-03-12 NOTE — TELEPHONE ENCOUNTER
Pt is not feeling any relief with the flexeril she states pain in back and legs is getting worse she want another medication to be call

## 2020-03-13 RX ORDER — BACLOFEN 10 MG/1
10 TABLET ORAL 3 TIMES DAILY
Qty: 30 TABLET | Refills: 1 | Status: SHIPPED | OUTPATIENT
Start: 2020-03-13 | End: 2020-04-07

## 2020-03-13 NOTE — TELEPHONE ENCOUNTER
It will take some time for it to improve.  I would encourage her to start PT and exercises, heat.  I sent a different muscle relaxor to the pharmacy, if that isn't helping she will need to see me in the office, thanks!

## 2020-03-17 ENCOUNTER — TELEPHONE (OUTPATIENT)
Dept: FAMILY MEDICINE CLINIC | Facility: CLINIC | Age: 50
End: 2020-03-17

## 2020-03-17 NOTE — TELEPHONE ENCOUNTER
Pt's original physical appointment on 4/24 is now rescheduled for 5/19 in the late afternoon. Pt is curious if labs can be drawn before or should pt schedule lab after physical?

## 2020-04-03 ENCOUNTER — TELEPHONE (OUTPATIENT)
Dept: FAMILY MEDICINE CLINIC | Facility: CLINIC | Age: 50
End: 2020-04-03

## 2020-04-03 NOTE — TELEPHONE ENCOUNTER
I would advise the patient to start taking ibuprofen 800 mg 2 times a day, heating pad and walking.  Please make video visit with me next week.  Thank you

## 2020-04-03 NOTE — TELEPHONE ENCOUNTER
Pt was in physical therapy but they are closed. She has been doing the exercises at home. She is not getting in relief from that. The pain medication that was given to her is not working. She

## 2020-04-07 ENCOUNTER — TELEMEDICINE (OUTPATIENT)
Dept: FAMILY MEDICINE CLINIC | Facility: CLINIC | Age: 50
End: 2020-04-07

## 2020-04-07 VITALS — HEIGHT: 62 IN | BODY MASS INDEX: 36.07 KG/M2 | WEIGHT: 196 LBS

## 2020-04-07 DIAGNOSIS — K21.9 GASTROESOPHAGEAL REFLUX DISEASE WITHOUT ESOPHAGITIS: ICD-10-CM

## 2020-04-07 DIAGNOSIS — G89.29 CHRONIC RIGHT-SIDED LOW BACK PAIN WITH RIGHT-SIDED SCIATICA: Primary | ICD-10-CM

## 2020-04-07 DIAGNOSIS — M54.41 CHRONIC RIGHT-SIDED LOW BACK PAIN WITH RIGHT-SIDED SCIATICA: Primary | ICD-10-CM

## 2020-04-07 PROCEDURE — 99214 OFFICE O/P EST MOD 30 MIN: CPT | Performed by: FAMILY MEDICINE

## 2020-04-07 RX ORDER — GABAPENTIN 100 MG/1
100 CAPSULE ORAL 2 TIMES DAILY
COMMUNITY
Start: 2020-03-19 | End: 2020-04-14 | Stop reason: SDUPTHER

## 2020-04-07 RX ORDER — METAXALONE 800 MG/1
800 TABLET ORAL 3 TIMES DAILY PRN
Qty: 15 TABLET | Refills: 2 | Status: SHIPPED | OUTPATIENT
Start: 2020-04-07 | End: 2020-05-19

## 2020-04-07 NOTE — PROGRESS NOTES
"    Adria Nogueira is a 49 y.o. female.     Chief Complaint   Patient presents with   • Sciatica     patient is having right leg pain   • Heartburn       HPI     Pt is a pleasant 49 y.o. YO female here for R leg pain.  Pt with lower back pain and R leg pain that has not been improving.  The pain is severe \"terrible\" that is now waking from sleep. She was seen for this 3/6 with similar symptoms that has improved.  She has been to PT and has been doing home PT.  She has done steroids, muscle relaxers and NSAIDs.  Unfortunately she is having worsening GERD and so taking more NSAIDs and prednisone is not helping.     GERD: not improving - stopped ranitidine with rebound symptoms.      The following portions of the patient's history were reviewed and updated as appropriate: allergies, current medications, past family history, past medical history, past social history, past surgical history and problem list.    Review of Systems   Constitutional: Negative.    HENT: Negative.    Eyes: Negative.    Respiratory: Negative.    Cardiovascular: Negative.    Gastrointestinal: Negative.    Endocrine: Negative.    Genitourinary: Negative.    Musculoskeletal: Positive for arthralgias, back pain and gait problem.   Skin: Negative.    Allergic/Immunologic: Negative.    Hematological: Negative.    Psychiatric/Behavioral: Negative.    All other systems reviewed and are negative.      Objective  There were no vitals filed for this visit.     Physical Exam   Constitutional: She appears well-developed and well-nourished. No distress.   Pulmonary/Chest: No respiratory distress.   Psychiatric: She has a normal mood and affect. Her behavior is normal. Judgment and thought content normal.         Current Outpatient Medications:   •  gabapentin (NEURONTIN) 100 MG capsule, Take 100 mg by mouth 2 (Two) Times a Day., Disp: , Rfl:   •  metaxalone (Skelaxin) 800 MG tablet, Take 1 tablet by mouth 3 (Three) Times a Day As Needed for Muscle Spasms., " Disp: 15 tablet, Rfl: 2  •  ondansetron ODT (ZOFRAN-ODT) 4 MG disintegrating tablet, Take 4 mg by mouth., Disp: , Rfl:   •  ranitidine (ZANTAC) 150 MG capsule, Take 1 capsule by mouth 2 (Two) Times a Day., Disp: 60 capsule, Rfl: 11    Procedures    Lab Results (most recent)     None              Adria was seen today for sciatica and heartburn.    Diagnoses and all orders for this visit:    Chronic right-sided low back pain with right-sided sciatica  -     metaxalone (Skelaxin) 800 MG tablet; Take 1 tablet by mouth 3 (Three) Times a Day As Needed for Muscle Spasms.    Gastroesophageal reflux disease without esophagitis    Pt with chronic back pain, not well controlled, saw pain management a couple of weeks ago and started Gabapentin without relief.  Started PT but unable to finish secondary to COVID19 pandemic.  Doing home exercises now.  Plan to change muscle relaxer to Skelaxin and FU with pain to consider epidural as she does get improvement with steroids.      GERD: Worsening since taking oral steroids and NSAIDs.  Stopped Ranitidine, start PEPCID and avoiding tomatoes and other foods that trigger symptoms.  Will wait to start PPI for now.     Visit started on video but finished on phone as issues with Internet connection.     Return if symptoms worsen or fail to improve, for Follow up with pain managment. .      Aura Graham MD    This was an audio and video enabled telemedicine encounter secondary to CDC recommendations and patient safety with COVID19 Pandemic.

## 2020-04-13 ENCOUNTER — TELEPHONE (OUTPATIENT)
Dept: FAMILY MEDICINE CLINIC | Facility: CLINIC | Age: 50
End: 2020-04-13

## 2020-04-13 NOTE — TELEPHONE ENCOUNTER
She will need note for  3 weeks starting 4/20/20 till 5/18/20 due to back pain ,she wanted us to fax note to angie manager admin  to [phone]

## 2020-04-14 NOTE — TELEPHONE ENCOUNTER
Patient called in stating that her left shoulder has gotten really bad and is in need of a refill of her gabapentin

## 2020-04-15 ENCOUNTER — TELEPHONE (OUTPATIENT)
Dept: FAMILY MEDICINE CLINIC | Facility: CLINIC | Age: 50
End: 2020-04-15

## 2020-04-15 RX ORDER — GABAPENTIN 100 MG/1
100 CAPSULE ORAL 2 TIMES DAILY
Qty: 60 CAPSULE | Refills: 2 | Status: SHIPPED | OUTPATIENT
Start: 2020-04-15 | End: 2020-05-19

## 2020-04-15 NOTE — TELEPHONE ENCOUNTER
PT IS HAVING SEVERE SHOULDER PAIN WHICH IS CAUSING HER TO NOT BE ABLE TO MOVE HER NECK OR HAND. PATIENT HAS BEEN TAKING NEURONTIN AND SKELAXIN AND IT IS NOT WORKING AND MAKING HER PAIN WORSE. PT ADVISED THAT SHE HAS BEEN SUFFERING THE WORST PAIN FOR 5 DAYS. PATIENT WOULD LIKE TO KNOW WHAT ELSE CAN BE DONE?     PT CALL BACK [phone]

## 2020-04-15 NOTE — TELEPHONE ENCOUNTER
I spoke to the pt she wasn't requesting gabapentin she was asking what else to do because even with all the medications the pain in shoulder and neck does not go away ,please advise

## 2020-04-16 NOTE — TELEPHONE ENCOUNTER
Unfortunately I would anticipate that her pain will take some time to improve. I am glad to refer her to an orthopedist but all non- emergent visits have been rescheduled.  I do know that Dr. Bueno is doing televisits - OK to refer if she would like to talk to an orthopedic specialist by video.

## 2020-04-17 NOTE — TELEPHONE ENCOUNTER
"Ok, I saw that the ortho saw her for lower back pain, not shoulder pain.  This would be a different evaluation.  I am sorry this is hard timing, but ice, rest and easy stretching is what I would recommend.  These issues usually do improve with ice and therapy over the course of a couple months in addition to the meds she has already been given.  Please encourage her to be patient. We can see her in the office once the pandemic restrictions have lifted.     Please have her do home exercises -  looks up:    \"[facility] shoulder exercises\"     If acute worsening, loss of sensation or strength then go to ER.       "

## 2020-04-17 NOTE — TELEPHONE ENCOUNTER
She went already to an ortho and they didn't help ,she said they suggest a neurologist maybe pinched nerve causing the pain

## 2020-05-18 ENCOUNTER — TELEPHONE (OUTPATIENT)
Dept: FAMILY MEDICINE CLINIC | Facility: CLINIC | Age: 50
End: 2020-05-18

## 2020-05-19 ENCOUNTER — TELEMEDICINE (OUTPATIENT)
Dept: FAMILY MEDICINE CLINIC | Facility: CLINIC | Age: 50
End: 2020-05-19

## 2020-05-19 VITALS — WEIGHT: 196 LBS | HEIGHT: 62 IN | BODY MASS INDEX: 36.07 KG/M2

## 2020-05-19 DIAGNOSIS — S46.912A STRAIN OF LEFT SHOULDER, INITIAL ENCOUNTER: ICD-10-CM

## 2020-05-19 DIAGNOSIS — M54.6 THORACIC SPINE PAIN: ICD-10-CM

## 2020-05-19 DIAGNOSIS — M54.12 CERVICAL RADICULOPATHY AT C8: Primary | ICD-10-CM

## 2020-05-19 PROCEDURE — 99213 OFFICE O/P EST LOW 20 MIN: CPT | Performed by: FAMILY MEDICINE

## 2020-05-19 RX ORDER — BACLOFEN 10 MG/1
10 TABLET ORAL 2 TIMES DAILY PRN
Qty: 60 TABLET | Refills: 3 | Status: SHIPPED | OUTPATIENT
Start: 2020-05-19 | End: 2020-08-12

## 2020-05-19 NOTE — PROGRESS NOTES
Adria Nogueira is a 49 y.o. female.     Chief Complaint   Patient presents with   • Shoulder Pain     Patient is having left shoulder pain and leg pain        HPI     Pt is a pleasant 49 y.o. YO female here for L shoulder pain.   Starts posteriorly by the shoulder blade and radiates down to the hand.  She has a pulsating pain.  It affects the ring finger and middle finger. She is right handed, not much work with L hand - so she has noticed weakness.  The pain is at rest.  She has tried skelaxin, Gabapentin and NSAID with minor improvement.  She is doing chiropractor with minimal improvement. Worst in the AM.     The following portions of the patient's history were reviewed and updated as appropriate: allergies, current medications, past family history, past medical history, past social history, past surgical history and problem list.    Review of Systems   Constitutional: Negative for activity change and appetite change.   HENT: Negative for congestion and dental problem.    Eyes: Negative for discharge and itching.   Respiratory: Negative for apnea and chest tightness.    Cardiovascular: Negative for chest pain and leg swelling.   Gastrointestinal: Negative for abdominal distention and abdominal pain.   Endocrine: Negative for cold intolerance and heat intolerance.   Genitourinary: Negative for difficulty urinating and dyspareunia.   Musculoskeletal:        Left shoulder pain and right leg pain    Skin: Negative for color change and pallor.   Allergic/Immunologic: Negative for environmental allergies and food allergies.   Neurological: Negative for dizziness and facial asymmetry.   Hematological: Negative for adenopathy. Does not bruise/bleed easily.   Psychiatric/Behavioral: Negative for agitation and behavioral problems.       Objective  There were no vitals filed for this visit.     Physical Exam   Constitutional: She appears well-developed and well-nourished. No distress.   Pulmonary/Chest: No respiratory  distress.   Psychiatric: She has a normal mood and affect. Her behavior is normal. Judgment and thought content normal.         Current Outpatient Medications:   •  baclofen (LIORESAL) 10 MG tablet, Take 1 tablet by mouth 2 (Two) Times a Day As Needed for Muscle Spasms., Disp: 60 tablet, Rfl: 3    Procedures    Lab Results (most recent)     None              Adria was seen today for shoulder pain.    Diagnoses and all orders for this visit:    Cervical radiculopathy at C8  -     Ambulatory Referral to Physical Therapy Evaluate and treat  -     baclofen (LIORESAL) 10 MG tablet; Take 1 tablet by mouth 2 (Two) Times a Day As Needed for Muscle Spasms.    Thoracic spine pain  -     Ambulatory Referral to Physical Therapy Evaluate and treat  -     baclofen (LIORESAL) 10 MG tablet; Take 1 tablet by mouth 2 (Two) Times a Day As Needed for Muscle Spasms.    Strain of left shoulder, initial encounter  -     Ambulatory Referral to Physical Therapy Evaluate and treat  -     baclofen (LIORESAL) 10 MG tablet; Take 1 tablet by mouth 2 (Two) Times a Day As Needed for Muscle Spasms.    Pt with ongoing neck and thoracic pain that radiates to the L hand in the C8 distribution, no trauma.  She works as a .  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  She does feel that with some rest this will help her symptoms.  She did have improvement with her lower back and leg pain with physical therapy but has had some return of symptoms since stopping therapy.  She did not feel normal with steroids, but is willing to give baclofen to try for muscle relaxing and will use Aleve with heat and ice.  Otherwise follow-up in 6 weeks if not improving with above therapies.    Return in about 6 weeks (around 6/30/2020), or if symptoms worsen or fail to improve, for Recheck Shoulder pain.      Aura Graham MD    This was an audio and video enabled telemedicine encounter secondary to CDC recommendations and patient  safety with COVID19 Pandemic.

## 2020-05-19 NOTE — PROGRESS NOTES
Left message for patient to inform her the letter is written and where she would like us to send it to.

## 2020-06-15 ENCOUNTER — OFFICE VISIT (OUTPATIENT)
Dept: FAMILY MEDICINE CLINIC | Facility: CLINIC | Age: 50
End: 2020-06-15

## 2020-06-15 VITALS
TEMPERATURE: 98.3 F | HEIGHT: 62 IN | HEART RATE: 65 BPM | WEIGHT: 198 LBS | SYSTOLIC BLOOD PRESSURE: 126 MMHG | DIASTOLIC BLOOD PRESSURE: 80 MMHG | OXYGEN SATURATION: 100 % | BODY MASS INDEX: 36.44 KG/M2

## 2020-06-15 DIAGNOSIS — Z13.1 SCREENING FOR DIABETES MELLITUS: ICD-10-CM

## 2020-06-15 DIAGNOSIS — E78.41 ELEVATED LIPOPROTEIN(A): ICD-10-CM

## 2020-06-15 DIAGNOSIS — E66.01 CLASS 2 SEVERE OBESITY DUE TO EXCESS CALORIES WITH SERIOUS COMORBIDITY AND BODY MASS INDEX (BMI) OF 36.0 TO 36.9 IN ADULT (HCC): ICD-10-CM

## 2020-06-15 DIAGNOSIS — Z11.59 ENCOUNTER FOR HEPATITIS C SCREENING TEST FOR LOW RISK PATIENT: ICD-10-CM

## 2020-06-15 DIAGNOSIS — R73.03 BORDERLINE DIABETES: ICD-10-CM

## 2020-06-15 DIAGNOSIS — Z00.00 HEALTH MAINTENANCE EXAMINATION: Primary | ICD-10-CM

## 2020-06-15 DIAGNOSIS — Z12.11 SCREEN FOR COLON CANCER: ICD-10-CM

## 2020-06-15 DIAGNOSIS — Z12.31 ENCOUNTER FOR SCREENING MAMMOGRAM FOR MALIGNANT NEOPLASM OF BREAST: ICD-10-CM

## 2020-06-15 PROBLEM — M47.816 LUMBAR SPONDYLOSIS: Status: ACTIVE | Noted: 2020-03-19

## 2020-06-15 PROCEDURE — 99396 PREV VISIT EST AGE 40-64: CPT | Performed by: FAMILY MEDICINE

## 2020-06-15 NOTE — PROGRESS NOTES
Adria Nogueira is a 49 y.o. female.     Chief Complaint   Patient presents with   • Annual Exam     no complains        HPI     Pt is a pleasant 49 y.o. YO female here for annual physical.     Health Maintenance   Topic Date Due   • ANNUAL PHYSICAL  07/30/1973   • HEPATITIS C SCREENING  01/06/2017   • PAP SMEAR  01/06/2017   • COLONOSCOPY  01/06/2017   • MAMMOGRAM  08/17/2018   • LIPID PANEL  04/30/2020   • ZOSTER VACCINE (1 of 2) 06/15/2028 (Originally 7/30/2018)   • INFLUENZA VACCINE  08/01/2020   • TDAP/TD VACCINES (2 - Td) 06/22/2029       Diet: eating more veggies, fasting 3 days a weeks.   Exercise: not exercising.   GYN: Not UTD - not had PAP in years  Immunizations: UTD      The following portions of the patient's history were reviewed and updated as appropriate: allergies, current medications, past family history, past medical history, past social history, past surgical history and problem list.    Review of Systems   Constitutional: Negative.    HENT: Negative.    Eyes: Negative.    Respiratory: Negative.    Cardiovascular: Negative.    Gastrointestinal: Negative.    Endocrine: Negative.    Genitourinary: Negative.    Musculoskeletal: Positive for arthralgias, back pain and myalgias.   Allergic/Immunologic: Negative.    Neurological: Negative.    Hematological: Negative.        Objective  Vitals:    06/15/20 1522   BP: 126/80   Pulse: 65   Temp: 98.3 °F (36.8 °C)   SpO2: 100%        Physical Exam   Constitutional: She is oriented to person, place, and time. She appears well-developed and well-nourished. No distress.   HENT:   Head: Normocephalic.   Nose: Nose normal.   Eyes: EOM are normal.   Cardiovascular: Normal rate, regular rhythm, normal heart sounds and intact distal pulses.   No murmur heard.  Pulmonary/Chest: Effort normal and breath sounds normal. No respiratory distress.   Musculoskeletal: Normal range of motion.   Neurological: She is alert and oriented to person, place, and time.   Skin:  Skin is warm and dry. No rash noted.   Psychiatric: She has a normal mood and affect. Her behavior is normal. Judgment and thought content normal.   Nursing note and vitals reviewed.        Current Outpatient Medications:   •  baclofen (LIORESAL) 10 MG tablet, Take 1 tablet by mouth 2 (Two) Times a Day As Needed for Muscle Spasms., Disp: 60 tablet, Rfl: 3    Procedures    Lab Results (most recent)     None              Adria was seen today for annual exam.    Diagnoses and all orders for this visit:    Health maintenance examination    Screening for diabetes mellitus  -     Comprehensive Metabolic Panel    Class 2 severe obesity due to excess calories with serious comorbidity and body mass index (BMI) of 36.0 to 36.9 in adult (CMS/[facility])  -     Vitamin D 25 Hydroxy  -     TSH  -     T4, Free    Borderline diabetes  -     CBC Auto Differential  -     TSH  -     T4, Free  -     Hemoglobin A1c    Elevated lipoprotein(a)  -     Lipid Panel    Encounter for screening mammogram for malignant neoplasm of breast  -     Mammo Screening Bilateral With CAD; Future    Screen for colon cancer  -     Ambulatory Referral For Screening Colonoscopy    Encounter for hepatitis C screening test for low risk patient  -     Hepatitis C Antibody    Here for annual exam, fasting labs ordered as above, immunizations up-to-date, colon cancer screening due for colon cancer screening, GYN health due for everything, cardiovascular screening neg for sx, counseled patient to increase vegetable intake, water and 150 minutes of exercise weekly combining weightbearing exercises and aerobic activity.      Return if symptoms worsen or fail to improve, for Recheck PAP and Labs prior or per her preference .      Aura Graham MD

## 2020-08-12 ENCOUNTER — OFFICE VISIT (OUTPATIENT)
Dept: FAMILY MEDICINE CLINIC | Facility: CLINIC | Age: 50
End: 2020-08-12

## 2020-08-12 VITALS
HEART RATE: 69 BPM | TEMPERATURE: 96.9 F | DIASTOLIC BLOOD PRESSURE: 80 MMHG | SYSTOLIC BLOOD PRESSURE: 128 MMHG | OXYGEN SATURATION: 99 % | HEIGHT: 62 IN | WEIGHT: 191.2 LBS | BODY MASS INDEX: 35.19 KG/M2

## 2020-08-12 DIAGNOSIS — S93.492A SPRAIN OF ANTERIOR TALOFIBULAR LIGAMENT OF LEFT ANKLE, INITIAL ENCOUNTER: ICD-10-CM

## 2020-08-12 DIAGNOSIS — M54.41 CHRONIC RIGHT-SIDED LOW BACK PAIN WITH RIGHT-SIDED SCIATICA: Primary | ICD-10-CM

## 2020-08-12 DIAGNOSIS — G89.29 CHRONIC RIGHT-SIDED LOW BACK PAIN WITH RIGHT-SIDED SCIATICA: Primary | ICD-10-CM

## 2020-08-12 PROCEDURE — 72110 X-RAY EXAM L-2 SPINE 4/>VWS: CPT | Performed by: FAMILY MEDICINE

## 2020-08-12 PROCEDURE — 99213 OFFICE O/P EST LOW 20 MIN: CPT | Performed by: FAMILY MEDICINE

## 2020-08-12 NOTE — PROGRESS NOTES
Adria Nogueira is a 50 y.o. female.     Chief Complaint   Patient presents with   • Back Pain     pt has constant L4-L5 back pain that radiates down R leg. sharp pain while moving and stationary since last friday    • Foot Pain     pt has L 5th metatarsal pain that shoots and burns for ~6 months        HPI     Pt is a pleasant 50 y.o. YO female here for chronic lower back pain, she has had episodes of chronic back pain in the past.  This episode started about 5 days ago.  No acute trauma.  It radiates down the right leg, is a aching, throbbing pain.  Now it is about a 5-6/10 - it was a 9/10.  Worst when standing for more time.  She has taken NSAIDs and baclofen, had done physical therapy in the past with substantial improvement.  But she stopped the meds 2/2 worsening GERD.     Patient with chronic pain of the left foot, lateral edge.  She has a burning and shooting pain for some time now.  IT started in April and has been helping.  She is having pain on the superior aspect of the foot and anterior to the lateral malleolus    The following portions of the patient's history were reviewed and updated as appropriate: allergies, current medications, past family history, past medical history, past social history, past surgical history and problem list.    Review of Systems   Constitutional: Negative.    HENT: Negative.    Eyes: Negative.    Respiratory: Negative.    Cardiovascular: Negative.  Negative for chest pain, palpitations and leg swelling.   Gastrointestinal: Negative.    Endocrine: Negative.    Genitourinary: Negative.    Musculoskeletal: Positive for back pain.   Skin: Negative.    Allergic/Immunologic: Negative.    Neurological: Negative.    Hematological: Negative.    Psychiatric/Behavioral: Negative.        Objective  Vitals:    08/12/20 1257   BP: 128/80   Pulse: 69   Temp: 96.9 °F (36.1 °C)   SpO2: 99%        Physical Exam   Constitutional: She is oriented to person, place, and time. She appears  well-developed and well-nourished. No distress.   HENT:   Head: Normocephalic.   Nose: Nose normal.   Eyes: EOM are normal.   Cardiovascular: Normal rate.   Pulmonary/Chest: Effort normal. No respiratory distress.   Musculoskeletal: Normal range of motion.   No tenderness along the lumbar spine, minimal tenderness along the paraspinal muscles bilaterally.  Negative leg raise bilaterally.  Neurovascularly intact.   Neurological: She is alert and oriented to person, place, and time.   Skin: Skin is warm and dry. No rash noted.   Psychiatric: She has a normal mood and affect. Her behavior is normal. Judgment and thought content normal.   Nursing note and vitals reviewed.      No current outpatient medications on file.    Procedures    Lab Results (most recent)     None        A Lumbar spine AP and lateral X-Ray was ordered. My reading of this film is mild degeneration of the lumbar spine with no acute signs of fracture or dislocation. No spondylosis or spondylolisthesis. (No comparison films available: pending review by Radiologist.)        Adria was seen today for back pain and foot pain.    Diagnoses and all orders for this visit:    Chronic right-sided low back pain with right-sided sciatica  -     XR Spine Lumbar 4+ View (In Office)  -     MRI Lumbar Spine Without Contrast; Future    Sprain of anterior talofibular ligament of left ankle, initial encounter    Patient with chronic lower back pain that is not improving.  Likely strained with leaning forward while cooking.  Continue with physical therapy she declined more medication, including steroids or muscle relaxers.  Okay to take NSAIDs as needed.  Will further evaluate with MRI of the lumbar spine.  X-ray showing some degeneration but no acute pathology for pain.  Okay to remain off work until she has recovered.    Patient with left ankle pain that seems to be related to the anterior talofibular ligament.  I think this is likely related to poor arch support.   Recommended that she change her shoes, her daughter has similar problems.  Follow-up if not improving.      Return in about 4 weeks (around 9/9/2020), or if symptoms worsen or fail to improve, for Recheck discuss MRI results..      Aura Graham MD

## 2020-08-27 ENCOUNTER — APPOINTMENT (OUTPATIENT)
Dept: OTHER | Facility: HOSPITAL | Age: 50
End: 2020-08-27

## 2020-08-27 ENCOUNTER — HOSPITAL ENCOUNTER (OUTPATIENT)
Dept: MRI IMAGING | Facility: HOSPITAL | Age: 50
Discharge: HOME | End: 2020-08-27
Admitting: FAMILY MEDICINE

## 2020-08-27 ENCOUNTER — TELEPHONE (OUTPATIENT)
Dept: FAMILY MEDICINE CLINIC | Facility: CLINIC | Age: 50
End: 2020-08-27

## 2020-08-27 DIAGNOSIS — G89.29 CHRONIC RIGHT-SIDED LOW BACK PAIN WITH RIGHT-SIDED SCIATICA: ICD-10-CM

## 2020-08-27 DIAGNOSIS — M54.41 CHRONIC RIGHT-SIDED LOW BACK PAIN WITH RIGHT-SIDED SCIATICA: ICD-10-CM

## 2020-08-27 DIAGNOSIS — Z09 FOLLOW UP: ICD-10-CM

## 2020-08-27 PROCEDURE — 72148 MRI LUMBAR SPINE W/O DYE: CPT

## 2020-09-04 ENCOUNTER — HOSPITAL ENCOUNTER (OUTPATIENT)
Dept: MAMMOGRAPHY | Facility: HOSPITAL | Age: 50
Discharge: HOME | End: 2020-09-04
Admitting: FAMILY MEDICINE

## 2020-09-04 DIAGNOSIS — Z12.31 ENCOUNTER FOR SCREENING MAMMOGRAM FOR MALIGNANT NEOPLASM OF BREAST: ICD-10-CM

## 2020-09-04 PROCEDURE — 77067 SCR MAMMO BI INCL CAD: CPT

## 2020-09-04 PROCEDURE — 77063 BREAST TOMOSYNTHESIS BI: CPT

## 2020-09-09 ENCOUNTER — TELEPHONE (OUTPATIENT)
Dept: FAMILY MEDICINE CLINIC | Facility: CLINIC | Age: 50
End: 2020-09-09

## 2020-09-09 ENCOUNTER — OFFICE VISIT (OUTPATIENT)
Dept: FAMILY MEDICINE CLINIC | Facility: CLINIC | Age: 50
End: 2020-09-09

## 2020-09-09 VITALS
OXYGEN SATURATION: 98 % | HEIGHT: 62 IN | HEART RATE: 93 BPM | BODY MASS INDEX: 36.07 KG/M2 | TEMPERATURE: 98 F | DIASTOLIC BLOOD PRESSURE: 72 MMHG | WEIGHT: 196 LBS | SYSTOLIC BLOOD PRESSURE: 102 MMHG

## 2020-09-09 DIAGNOSIS — M48.062 SPINAL STENOSIS OF LUMBAR REGION WITH NEUROGENIC CLAUDICATION: Primary | ICD-10-CM

## 2020-09-09 DIAGNOSIS — E66.01 CLASS 2 SEVERE OBESITY DUE TO EXCESS CALORIES WITH SERIOUS COMORBIDITY AND BODY MASS INDEX (BMI) OF 36.0 TO 36.9 IN ADULT (HCC): ICD-10-CM

## 2020-09-09 DIAGNOSIS — E55.9 HYPOVITAMINOSIS D: ICD-10-CM

## 2020-09-09 DIAGNOSIS — E78.41 ELEVATED LIPOPROTEIN(A): ICD-10-CM

## 2020-09-09 DIAGNOSIS — R73.03 BORDERLINE DIABETES: ICD-10-CM

## 2020-09-09 DIAGNOSIS — E66.01 MORBID (SEVERE) OBESITY DUE TO EXCESS CALORIES (HCC): ICD-10-CM

## 2020-09-09 DIAGNOSIS — L03.012 PARONYCHIA, FINGER, LEFT: ICD-10-CM

## 2020-09-09 DIAGNOSIS — D50.8 IRON DEFICIENCY ANEMIA SECONDARY TO INADEQUATE DIETARY IRON INTAKE: Primary | ICD-10-CM

## 2020-09-09 DIAGNOSIS — R73.9 HYPERGLYCEMIA: ICD-10-CM

## 2020-09-09 PROCEDURE — 99214 OFFICE O/P EST MOD 30 MIN: CPT | Performed by: FAMILY MEDICINE

## 2020-09-09 NOTE — TELEPHONE ENCOUNTER
At check out patient mentioned possible lab orders. Patient's last lab draw was in May. Resulted in prediabetes and low vitamin d, with mild amenia, still improvement within the last two years. Advise to take vit d supplement, and recheck lab in June. Should labs be ordered since patient was evaluated today?

## 2020-09-10 ENCOUNTER — TELEPHONE (OUTPATIENT)
Dept: FAMILY MEDICINE CLINIC | Facility: CLINIC | Age: 50
End: 2020-09-10

## 2020-09-10 NOTE — TELEPHONE ENCOUNTER
If you see the visit from Kristen 15, I did place the orders to be done fasting at that time, it looks like they have been canceled for some reason?  Patient does need fasting labs.  I placed the orders again, please schedule patient for fasting labs.      Yamilet, I am forwarding this chart to get your opinion.  I am running into more situations recently where I ordered labs and then they are canceled prior to being performed.  This is creating duplicate work to put in orders multiple times.  We can discuss this later but wanted to get an idea of how to best way our workflow.  Thanks!

## 2020-09-10 NOTE — TELEPHONE ENCOUNTER
Called to schedule lab appointment, She wants to go on a Saturday and will call and schedule her own appointment at a different location.

## 2020-09-11 ENCOUNTER — TELEPHONE (OUTPATIENT)
Dept: FAMILY MEDICINE CLINIC | Facility: CLINIC | Age: 50
End: 2020-09-11

## 2020-11-12 ENCOUNTER — APPOINTMENT (OUTPATIENT)
Dept: MAMMOGRAPHY | Facility: HOSPITAL | Age: 50
End: 2020-11-12

## 2021-03-26 ENCOUNTER — BULK ORDERING (OUTPATIENT)
Dept: CASE MANAGEMENT | Facility: OTHER | Age: 51
End: 2021-03-26

## 2021-03-26 DIAGNOSIS — Z23 IMMUNIZATION DUE: ICD-10-CM

## 2021-04-07 LAB
25(OH)D3+25(OH)D2 SERPL-MCNC: 20.2 NG/ML
ALBUMIN SERPL-MCNC: 3.9 G/DL (ref 3.5–5.2)
ALBUMIN/GLOB SERPL: 1.4 G/DL
ALP SERPL-CCNC: 83 U/L (ref 39–117)
ALT SERPL-CCNC: 21 U/L (ref 1–33)
AST SERPL-CCNC: 15 U/L (ref 1–32)
BASOPHILS # BLD AUTO: 0.03 10*3/MM3 (ref 0–0.2)
BASOPHILS NFR BLD AUTO: 0.5 % (ref 0–1.5)
BILIRUB SERPL-MCNC: 0.4 MG/DL (ref 0–1.2)
BUN SERPL-MCNC: 14 MG/DL (ref 6–20)
BUN/CREAT SERPL: 22.2 (ref 7–25)
CALCIUM SERPL-MCNC: 9.1 MG/DL (ref 8.6–10.5)
CHLORIDE SERPL-SCNC: 104 MMOL/L (ref 98–107)
CHOLEST SERPL-MCNC: 244 MG/DL (ref 0–200)
CO2 SERPL-SCNC: 29.7 MMOL/L (ref 22–29)
CREAT SERPL-MCNC: 0.63 MG/DL (ref 0.57–1)
EOSINOPHIL # BLD AUTO: 0.07 10*3/MM3 (ref 0–0.4)
EOSINOPHIL NFR BLD AUTO: 1.2 % (ref 0.3–6.2)
ERYTHROCYTE [DISTWIDTH] IN BLOOD BY AUTOMATED COUNT: 13 % (ref 12.3–15.4)
GLOBULIN SER CALC-MCNC: 2.7 GM/DL
GLUCOSE SERPL-MCNC: 90 MG/DL (ref 65–99)
HCT VFR BLD AUTO: 33.2 % (ref 34–46.6)
HDLC SERPL-MCNC: 57 MG/DL (ref 40–60)
HGB BLD-MCNC: 11.2 G/DL (ref 12–15.9)
IMM GRANULOCYTES # BLD AUTO: 0.01 10*3/MM3 (ref 0–0.05)
IMM GRANULOCYTES NFR BLD AUTO: 0.2 % (ref 0–0.5)
LDLC SERPL CALC-MCNC: 169 MG/DL (ref 0–100)
LYMPHOCYTES # BLD AUTO: 2.26 10*3/MM3 (ref 0.7–3.1)
LYMPHOCYTES NFR BLD AUTO: 39.2 % (ref 19.6–45.3)
MCH RBC QN AUTO: 29.2 PG (ref 26.6–33)
MCHC RBC AUTO-ENTMCNC: 33.7 G/DL (ref 31.5–35.7)
MCV RBC AUTO: 86.7 FL (ref 79–97)
MONOCYTES # BLD AUTO: 0.48 10*3/MM3 (ref 0.1–0.9)
MONOCYTES NFR BLD AUTO: 8.3 % (ref 5–12)
NEUTROPHILS # BLD AUTO: 2.91 10*3/MM3 (ref 1.7–7)
NEUTROPHILS NFR BLD AUTO: 50.6 % (ref 42.7–76)
NRBC BLD AUTO-RTO: 0 /100 WBC (ref 0–0.2)
PLATELET # BLD AUTO: 264 10*3/MM3 (ref 140–450)
POTASSIUM SERPL-SCNC: 4.3 MMOL/L (ref 3.5–5.2)
PROT SERPL-MCNC: 6.6 G/DL (ref 6–8.5)
RBC # BLD AUTO: 3.83 10*6/MM3 (ref 3.77–5.28)
SODIUM SERPL-SCNC: 142 MMOL/L (ref 136–145)
T4 FREE SERPL-MCNC: 1.3 NG/DL (ref 0.93–1.7)
TRIGL SERPL-MCNC: 103 MG/DL (ref 0–150)
TSH SERPL DL<=0.005 MIU/L-ACNC: 2.07 UIU/ML (ref 0.27–4.2)
VLDLC SERPL CALC-MCNC: 18 MG/DL (ref 5–40)
WBC # BLD AUTO: 5.76 10*3/MM3 (ref 3.4–10.8)

## 2021-06-21 ENCOUNTER — OFFICE VISIT (OUTPATIENT)
Dept: FAMILY MEDICINE CLINIC | Facility: CLINIC | Age: 51
End: 2021-06-21

## 2021-06-21 VITALS
SYSTOLIC BLOOD PRESSURE: 114 MMHG | DIASTOLIC BLOOD PRESSURE: 78 MMHG | OXYGEN SATURATION: 99 % | HEIGHT: 62 IN | TEMPERATURE: 97.8 F | BODY MASS INDEX: 34.41 KG/M2 | WEIGHT: 187 LBS | HEART RATE: 82 BPM

## 2021-06-21 DIAGNOSIS — E78.41 ELEVATED LIPOPROTEIN(A): ICD-10-CM

## 2021-06-21 DIAGNOSIS — E66.01 CLASS 2 SEVERE OBESITY DUE TO EXCESS CALORIES WITH SERIOUS COMORBIDITY AND BODY MASS INDEX (BMI) OF 36.0 TO 36.9 IN ADULT (HCC): ICD-10-CM

## 2021-06-21 DIAGNOSIS — E55.9 HYPOVITAMINOSIS D: ICD-10-CM

## 2021-06-21 DIAGNOSIS — Z00.00 HEALTH MAINTENANCE EXAMINATION: Primary | ICD-10-CM

## 2021-06-21 DIAGNOSIS — Z12.11 SCREEN FOR COLON CANCER: ICD-10-CM

## 2021-06-21 DIAGNOSIS — R73.9 HYPERGLYCEMIA: ICD-10-CM

## 2021-06-21 PROCEDURE — 99396 PREV VISIT EST AGE 40-64: CPT | Performed by: FAMILY MEDICINE

## 2021-06-21 NOTE — PROGRESS NOTES
"Chief Complaint  Annual Exam (complains about  back pain still hurting some  days )    Heraclio Nogueira presents to [facility]  History of Present Illness  Annual Exam.      Health Maintenance   Topic Date Due   • COLORECTAL CANCER SCREENING  Never done   • HEPATITIS C SCREENING  Never done   • PAP SMEAR  Never done   • ANNUAL PHYSICAL  06/16/2021   • ZOSTER VACCINE (1 of 2) 06/15/2028 (Originally 7/30/2018)   • INFLUENZA VACCINE  08/01/2021   • LIPID PANEL  04/06/2022   • MAMMOGRAM  09/04/2022   • TDAP/TD VACCINES (2 - Td or Tdap) 06/22/2029   • COVID-19 Vaccine  Completed   • Pneumococcal Vaccine 0-64  Aged Out       Diet: over all she is feeling well, low sugar, black coffee  Exercise: does well when exercises   GYN: Due for GYN exam, although the ointment.  She may need to give her some names of gynecologist as well.  Immunizations: Up-to-date  Colon cancer screening: Due for screening colonoscopy.  Sleep/mental health: Anxiety, overall doing well.  She does not feel that the greatest risk there is lower back pain.    Still having lower back pain - has had some relief of the exercises - she does not feel ready to have refer to pain mgmt or spine surgery.  She did see pain management and recs for epidural, bur declined.     Objective   Vital Signs:   /78   Pulse 82   Temp 97.8 °F (36.6 °C)   Ht 157.5 cm (62\")   Wt 84.8 kg (187 lb)   SpO2 99%   BMI 34.20 kg/m²     Physical Exam  Vitals and nursing note reviewed.   Constitutional:       General: She is not in acute distress.     Appearance: She is well-developed.   HENT:      Head: Normocephalic.      Nose: Nose normal.   Cardiovascular:      Rate and Rhythm: Normal rate and regular rhythm.      Heart sounds: Normal heart sounds. No murmur heard.     Pulmonary:      Effort: Pulmonary effort is normal. No respiratory distress.      Breath sounds: Normal breath sounds.   Musculoskeletal:         General: " Normal range of motion.   Skin:     General: Skin is warm and dry.      Findings: No rash.   Neurological:      Mental Status: She is alert and oriented to person, place, and time.   Psychiatric:         Behavior: Behavior normal.         Thought Content: Thought content normal.         Judgment: Judgment normal.        Result Review :   The following data was reviewed by: Aura Graham MD on 06/21/2021:  Common labs    Common Labsle 4/6/21 4/6/21 4/6/21    0941 0941 0941   Glucose 90     BUN 14     Creatinine 0.63     eGFR Non  Am 99     eGFR African Am 120     Sodium 142     Potassium 4.3     Chloride 104     Calcium 9.1     Total Protein 6.6     Albumin 3.90     Total Bilirubin 0.4     Alkaline Phosphatase 83     AST (SGOT) 15     ALT (SGPT) 21     WBC   5.76   Hemoglobin   11.2 (A)   Hematocrit   33.2 (A)   Platelets   264   Total Cholesterol  244 (A)    Triglycerides  103    HDL Cholesterol  57    LDL Cholesterol   169 (A)    (A) Abnormal value       Comments are available for some flowsheets but are not being displayed.                     Assessment and Plan    Diagnoses and all orders for this visit:    1. Health maintenance examination (Primary)    2. Screen for colon cancer  -     Ambulatory Referral For Screening Colonoscopy    3. Hyperglycemia  -     Hemoglobin A1c; Future    4. Hypovitaminosis D    5. Elevated lipoprotein(a)  -     Lipid Panel; Future    6. Class 2 severe obesity due to excess calories with serious comorbidity and body mass index (BMI) of 36.0 to 36.9 in adult (CMS/[facility])  -     Comprehensive Metabolic Panel; Future  -     CBC & Differential; Future  -     Lipid Panel; Future    Here for annual exam, fasting labs ordered as above, immunizations up-to-date, colon cancer screening ordered as above, GYN health will set up follow-up with pulmonologist.  If not able to do this -will make follow-up gynecology exam here, cardiovascular screening up-to-date, counseled patient to increase  vegetable intake, water and 150 minutes of exercise weekly combining weightbearing exercises and aerobic activity.    back pain stable, fall pain management and does not want to do epidural she is doing her exercises regularly which helps.  Working a few days a week, she enjoys this and wants to continue.  Follow-up if worsening or new symptoms.      Follow Up   Return in about 6 months (around 12/21/2021), or if symptoms worsen or fail to improve, for Recheck Hyperglycemia with labs prior.  Patient was given instructions and counseling regarding her condition or for health maintenance advice. Please see specific information pulled into the AVS if appropriate. Medical assistant and I wore mask and eyewear protection during entire encounter.  Patient wore mask.    Aura Graham MD

## 2021-06-22 ENCOUNTER — TELEPHONE (OUTPATIENT)
Dept: GASTROENTEROLOGY | Facility: CLINIC | Age: 51
End: 2021-06-22

## 2022-06-02 ENCOUNTER — OFFICE VISIT (OUTPATIENT)
Dept: FAMILY MEDICINE CLINIC | Facility: CLINIC | Age: 52
End: 2022-06-02

## 2022-06-02 VITALS
TEMPERATURE: 97.8 F | SYSTOLIC BLOOD PRESSURE: 110 MMHG | WEIGHT: 189 LBS | HEART RATE: 83 BPM | OXYGEN SATURATION: 99 % | BODY MASS INDEX: 34.78 KG/M2 | DIASTOLIC BLOOD PRESSURE: 68 MMHG | HEIGHT: 62 IN

## 2022-06-02 DIAGNOSIS — G43.709 CHRONIC MIGRAINE WITHOUT AURA WITHOUT STATUS MIGRAINOSUS, NOT INTRACTABLE: ICD-10-CM

## 2022-06-02 DIAGNOSIS — Z11.59 ENCOUNTER FOR HEPATITIS C SCREENING TEST FOR LOW RISK PATIENT: ICD-10-CM

## 2022-06-02 DIAGNOSIS — E78.41 ELEVATED LIPOPROTEIN(A): Primary | ICD-10-CM

## 2022-06-02 DIAGNOSIS — R73.9 HYPERGLYCEMIA: ICD-10-CM

## 2022-06-02 DIAGNOSIS — E66.01 CLASS 2 SEVERE OBESITY DUE TO EXCESS CALORIES WITH SERIOUS COMORBIDITY AND BODY MASS INDEX (BMI) OF 36.0 TO 36.9 IN ADULT: ICD-10-CM

## 2022-06-02 PROCEDURE — 99214 OFFICE O/P EST MOD 30 MIN: CPT | Performed by: FAMILY MEDICINE

## 2022-06-02 RX ORDER — TOPIRAMATE 25 MG/1
25 TABLET ORAL 2 TIMES DAILY
Qty: 60 TABLET | Refills: 1 | Status: SHIPPED | OUTPATIENT
Start: 2022-06-02

## 2022-06-02 RX ORDER — RIZATRIPTAN BENZOATE 10 MG/1
10 TABLET, ORALLY DISINTEGRATING ORAL ONCE AS NEEDED
Qty: 9 TABLET | Refills: 4 | Status: SHIPPED | OUTPATIENT
Start: 2022-06-02

## 2022-06-02 NOTE — PROGRESS NOTES
"Chief Complaint  Migraine (Issues with migraines getting worse and sun light triggers  and acid reflux also can make this worse)    Subjective        Adria Nogueira presents to [facility]  History of Present Illness  Pleasant 51-year-old female here to discuss migraines and worsening GERD.    Headache - triggered by sun exposure and GERD, if she wakes up quickly then she also gets a headache.  She did trip and fall at her sons graduation and fell forward, this is the reason her has been made the appointment.  However, she states that the headaches have not changed since this fall but have been chronic since she was 8 YO, she gets more throbbing pain on the R side and rarely the L side, and + photo and phono phobia, rare nausea.  She did see a neurologist in [place] and took a medicine that did not help. She does take cilalantro. She does remember taking Imitrex with was not helpful and another medication that she does not recall.  She gets about 15 headaches a month.    Lower back pain - doing well with home exercises and back brace.     She also has hyperglycemia, we discussed that her numbers at the last visit were close to diabetic ranges.  She had forgotten about this.  We discussed the importance of following up regularly as its been a year since she was last seen.    Objective   Vital Signs:  /68   Pulse 83   Temp 97.8 °F (36.6 °C)   Ht 157.5 cm (62\")   Wt 85.7 kg (189 lb)   SpO2 99%   BMI 34.57 kg/m²           Physical Exam  Vitals and nursing note reviewed.   Constitutional:       General: She is not in acute distress.     Appearance: She is well-developed.   HENT:      Head: Normocephalic.      Nose: Nose normal.   Cardiovascular:      Rate and Rhythm: Normal rate and regular rhythm.      Heart sounds: Normal heart sounds. No murmur heard.  Pulmonary:      Effort: Pulmonary effort is normal. No respiratory distress.      Breath sounds: Normal breath sounds. "   Musculoskeletal:         General: Normal range of motion.      Comments: Normal palpation of the skull, maxillary and temporal bones, normal exam of the cervical and thoracic spine.   Skin:     General: Skin is warm and dry.      Findings: No rash.   Neurological:      General: No focal deficit present.      Mental Status: She is alert and oriented to person, place, and time.      Cranial Nerves: No cranial nerve deficit.   Psychiatric:         Behavior: Behavior normal.         Thought Content: Thought content normal.         Judgment: Judgment normal.        Result Review :                Assessment and Plan   Diagnoses and all orders for this visit:    1. Elevated lipoprotein(a) (Primary)  -     Comprehensive Metabolic Panel  -     CBC & Differential  -     Lipid Panel  -     TSH Rfx On Abnormal To Free T4    2. Hyperglycemia  -     Hemoglobin A1c    3. Class 2 severe obesity due to excess calories with serious comorbidity and body mass index (BMI) of 36.0 to 36.9 in adult (HCC)  -     Comprehensive Metabolic Panel  -     CBC & Differential  -     Lipid Panel  -     TSH Rfx On Abnormal To Free T4    4. Encounter for hepatitis C screening test for low risk patient  -     Hepatitis C Antibody    5. Chronic migraine without aura without status migrainosus, not intractable  -     rizatriptan MLT (MAXALT-MLT) 10 MG disintegrating tablet; Place 1 tablet on the tongue 1 (One) Time As Needed for Migraine for up to 1 dose. May repeat in 2 hours if needed  Dispense: 9 tablet; Refill: 4  -     topiramate (TOPAMAX) 25 MG tablet; Take 1 tablet by mouth 2 (Two) Times a Day. Migraine  Dispense: 60 tablet; Refill: 1    Pleasant 51-year-old female here to follow-up for chronic migraines which are not well controlled.  About 15 episodes per month, tried sumatriptan in the past without improvement with neurologist, does not remember the names of other medication she has failed.  We will start with Topamax and Maxalt as above.   Follow-up in 3 months with headache journal.    Lower back pain stable, okay to continue with home exercises.    We also discussed hyperglycemia, she was close to diabetic ranges with the last check.  She has forgotten about this.  We discussed limiting carbs especially rice.  We will get labs as above when fasting and follow-up in 3 months.       Follow Up   Return in about 3 months (around 9/2/2022), or if symptoms worsen or fail to improve, for Recheck migraines with labs prior .  Patient was given instructions and counseling regarding her condition or for health maintenance advice. Please see specific information pulled into the AVS if appropriate. Medical assistant and I wore mask and eyewear protection during entire encounter.  Patient wore mask.    Aura Graham MD

## 2022-08-10 ENCOUNTER — OFFICE VISIT (OUTPATIENT)
Dept: FAMILY MEDICINE CLINIC | Facility: CLINIC | Age: 52
End: 2022-08-10

## 2022-08-10 VITALS
WEIGHT: 184 LBS | HEART RATE: 81 BPM | RESPIRATION RATE: 16 BRPM | OXYGEN SATURATION: 98 % | TEMPERATURE: 98.4 F | DIASTOLIC BLOOD PRESSURE: 78 MMHG | SYSTOLIC BLOOD PRESSURE: 98 MMHG | BODY MASS INDEX: 33.65 KG/M2

## 2022-08-10 DIAGNOSIS — L50.9 URTICARIA: Primary | ICD-10-CM

## 2022-08-10 PROCEDURE — 99213 OFFICE O/P EST LOW 20 MIN: CPT | Performed by: FAMILY MEDICINE

## 2022-08-10 NOTE — PROGRESS NOTES
Subjective     Adria Nogueira is a 52 y.o. female.     Chief Complaint   Patient presents with   • Urticaria     X 4 days thighs breast back stomach.     • Itchy Eye     And nose.       Rash  This is a new problem. Episode onset: 4 days  The rash is diffuse. The rash is characterized by dryness, redness and itchiness. Pertinent negatives include no anorexia, congestion, cough, diarrhea, eye pain, facial edema, fatigue, fever, joint pain, rhinorrhea, shortness of breath, sore throat or vomiting. (Itchy eyes) Past treatments include topical steroids. The treatment provided moderate relief.        The following portions of the patient's history were reviewed and updated as appropriate: allergies, current medications, past family history, past medical history, past social history, past surgical history and problem list.        Review of Systems   Constitutional: Negative for fatigue and fever.   HENT: Negative for congestion, rhinorrhea and sore throat.    Eyes: Negative for pain.   Respiratory: Negative for apnea, cough, choking, chest tightness, shortness of breath, wheezing and stridor.    Cardiovascular: Negative for chest pain, palpitations and leg swelling.   Gastrointestinal: Negative for anorexia, diarrhea and vomiting.   Musculoskeletal: Negative for joint pain.   Skin: Positive for color change and rash.       Vitals:    08/10/22 1405   BP: 98/78   Pulse: 81   Resp: 16   Temp: 98.4 °F (36.9 °C)   SpO2: 98%           08/10/22  1405   Weight: 83.5 kg (184 lb)         Body mass index is 33.65 kg/m².      Current Outpatient Medications   Medication Sig Dispense Refill   • rizatriptan MLT (MAXALT-MLT) 10 MG disintegrating tablet Place 1 tablet on the tongue 1 (One) Time As Needed for Migraine for up to 1 dose. May repeat in 2 hours if needed 9 tablet 4   • topiramate (TOPAMAX) 25 MG tablet Take 1 tablet by mouth 2 (Two) Times a Day. Migraine 60 tablet 1   • triamcinolone (KENALOG) 0.1 % ointment Apply  topically  to the appropriate area as directed.       No current facility-administered medications for this visit.                Objective   Physical Exam  Vitals and nursing note reviewed.   Constitutional:       General: She is not in acute distress.     Appearance: She is not ill-appearing, toxic-appearing or diaphoretic.   HENT:      Nose: No congestion or rhinorrhea.      Mouth/Throat:      Mouth: Mucous membranes are moist.      Comments:    Normal looking central uvula       Eyes:      General: No scleral icterus.        Right eye: No discharge.         Left eye: No discharge.      Extraocular Movements: Extraocular movements intact.      Conjunctiva/sclera: Conjunctivae normal.      Pupils: Pupils are equal, round, and reactive to light.   Cardiovascular:      Rate and Rhythm: Normal rate and regular rhythm.      Heart sounds: Normal heart sounds. No murmur heard.  Pulmonary:      Effort: Pulmonary effort is normal. No respiratory distress.      Breath sounds: Normal breath sounds. No stridor. No wheezing or rhonchi.   Skin:     General: Skin is warm.      Comments: Very mild rash over abd wall     Neurological:      Mental Status: She is alert and oriented to person, place, and time.   Psychiatric:         Mood and Affect: Mood normal.         Behavior: Behavior normal.         Thought Content: Thought content normal.           Assessment & Plan   Diagnoses and all orders for this visit:    1. Urticaria (Primary);  - Not sure what she exposed to, no h/o new detergent/chemicals/diet  - Sx improved with topical steroid and benadryl   -     Ambulatory Referral to Allergy  - Discussed supportive care     I was wearing N95 mask and eye protection during the entire duration of the visit.   Patient was masked the whole entire time.   Minimum social distance of 6 ft maintained through entire visit except for physical exam as documented.          I have fully discussed the nature of the medical condition(s) risks,  complications, management, safe and proper use of medications.   She stated no allergy to the above prescribed medication.  I have discussed the SIDE EFFECT OF MEDICATION and importance TO report any side effect , the patient expressed good understanding.  Encouraged medication compliance and the importance of keeping scheduled follow up appointments with me and any other providers.    Patient instructed to follow up with our office for results on any labs/imaging ordered during this visit.    Home care discussed  All questions answered  Patient verbalizes understanding and agrees to treatment plan.     Follow up: Return for if no better or worsening symptoms.

## 2023-02-20 ENCOUNTER — TELEPHONE (OUTPATIENT)
Dept: FAMILY MEDICINE CLINIC | Facility: CLINIC | Age: 53
End: 2023-02-20

## 2023-02-20 ENCOUNTER — TELEPHONE (OUTPATIENT)
Dept: FAMILY MEDICINE CLINIC | Facility: CLINIC | Age: 53
End: 2023-02-20
Payer: COMMERCIAL

## 2023-02-20 DIAGNOSIS — L50.9 URTICARIA: Primary | ICD-10-CM

## 2023-02-20 NOTE — TELEPHONE ENCOUNTER
Caller: Adria Nogueira    Relationship: Self    Best call back number: [phone]    What was the call regarding: PATIENT STATED SHE SPOKE TO DR FISH PERSONALLY ABOUT HER ACCEPTING HER  AS A NEW PATIENT. PATIENT WOULD LIKE TO KNOW IF DR FISH WOULD STILL BE WILLING TO ACCEPT HIM AS A NEW PATIENT. PLEASE ADVISE.      Do you require a callback: YES

## 2023-02-20 NOTE — TELEPHONE ENCOUNTER
Caller: Adria Nogueira    Relationship: Self    Best call back number: [phone]    What was the call regarding: PATIENT STATED THAT BACK IN AUGUST WHEN SHE SAW DR FREITAS, SHE WAS REFERRED TO AN ALLERGIST. PATIENT NEVER SAW THE ALLERGIST AND WOULD STILL LIKE TO GET IN TO SEE ONE. PLEASE ADVISE.    Do you require a callback: YES

## 2023-02-20 NOTE — TELEPHONE ENCOUNTER
Caller: Adria Nogueira    Relationship: Self    Best call back number: [phone]    What orders are you requesting (i.e. lab or imaging): BLOOD WORK    Additional notes: PATIENT STATED THAT BACK IN AUGUST WHEN SHE SAW DR FREITAS, SHE WAS SUPPOSED TO GET BLOOD WORK BUT DID NOT COME IN. PLEASE ADVISE.

## 2023-02-21 NOTE — TELEPHONE ENCOUNTER
Unfortunate not able to speak to her personally but I am glad to accept her has been as a new patient.  I am excepting family members of existing patients.

## 2023-03-11 LAB
ALBUMIN SERPL-MCNC: 4.4 G/DL (ref 3.5–5.2)
ALBUMIN/GLOB SERPL: 1.8 G/DL
ALP SERPL-CCNC: 106 U/L (ref 39–117)
ALT SERPL-CCNC: 14 U/L (ref 1–33)
AST SERPL-CCNC: 20 U/L (ref 1–32)
BASOPHILS # BLD AUTO: 0.04 10*3/MM3 (ref 0–0.2)
BASOPHILS NFR BLD AUTO: 0.7 % (ref 0–1.5)
BILIRUB SERPL-MCNC: 0.5 MG/DL (ref 0–1.2)
BUN SERPL-MCNC: 13 MG/DL (ref 6–20)
BUN/CREAT SERPL: 18.6 (ref 7–25)
CALCIUM SERPL-MCNC: 9.6 MG/DL (ref 8.6–10.5)
CHLORIDE SERPL-SCNC: 101 MMOL/L (ref 98–107)
CHOLEST SERPL-MCNC: 251 MG/DL (ref 0–200)
CO2 SERPL-SCNC: 27.4 MMOL/L (ref 22–29)
CREAT SERPL-MCNC: 0.7 MG/DL (ref 0.57–1)
EGFRCR SERPLBLD CKD-EPI 2021: 102.9 ML/MIN/1.73
EOSINOPHIL # BLD AUTO: 0.08 10*3/MM3 (ref 0–0.4)
EOSINOPHIL NFR BLD AUTO: 1.4 % (ref 0.3–6.2)
ERYTHROCYTE [DISTWIDTH] IN BLOOD BY AUTOMATED COUNT: 13 % (ref 12.3–15.4)
GLOBULIN SER CALC-MCNC: 2.4 GM/DL
GLUCOSE SERPL-MCNC: 96 MG/DL (ref 65–99)
HBA1C MFR BLD: 5.9 % (ref 4.8–5.6)
HCT VFR BLD AUTO: 35.8 % (ref 34–46.6)
HCV IGG SERPL QL IA: NON REACTIVE
HDLC SERPL-MCNC: 69 MG/DL (ref 40–60)
HGB BLD-MCNC: 11.8 G/DL (ref 12–15.9)
IMM GRANULOCYTES # BLD AUTO: 0.02 10*3/MM3 (ref 0–0.05)
IMM GRANULOCYTES NFR BLD AUTO: 0.3 % (ref 0–0.5)
LDLC SERPL CALC-MCNC: 165 MG/DL (ref 0–100)
LYMPHOCYTES # BLD AUTO: 2.21 10*3/MM3 (ref 0.7–3.1)
LYMPHOCYTES NFR BLD AUTO: 37.8 % (ref 19.6–45.3)
MCH RBC QN AUTO: 28.8 PG (ref 26.6–33)
MCHC RBC AUTO-ENTMCNC: 33 G/DL (ref 31.5–35.7)
MCV RBC AUTO: 87.3 FL (ref 79–97)
MONOCYTES # BLD AUTO: 0.46 10*3/MM3 (ref 0.1–0.9)
MONOCYTES NFR BLD AUTO: 7.9 % (ref 5–12)
NEUTROPHILS # BLD AUTO: 3.03 10*3/MM3 (ref 1.7–7)
NEUTROPHILS NFR BLD AUTO: 51.9 % (ref 42.7–76)
NRBC BLD AUTO-RTO: 0 /100 WBC (ref 0–0.2)
PLATELET # BLD AUTO: 282 10*3/MM3 (ref 140–450)
POTASSIUM SERPL-SCNC: 4 MMOL/L (ref 3.5–5.2)
PROT SERPL-MCNC: 6.8 G/DL (ref 6–8.5)
RBC # BLD AUTO: 4.1 10*6/MM3 (ref 3.77–5.28)
SODIUM SERPL-SCNC: 139 MMOL/L (ref 136–145)
TRIGL SERPL-MCNC: 96 MG/DL (ref 0–150)
TSH SERPL DL<=0.005 MIU/L-ACNC: 1.75 UIU/ML (ref 0.27–4.2)
VLDLC SERPL CALC-MCNC: 17 MG/DL (ref 5–40)
WBC # BLD AUTO: 5.84 10*3/MM3 (ref 3.4–10.8)

## 2023-06-19 ENCOUNTER — OFFICE VISIT (OUTPATIENT)
Dept: FAMILY MEDICINE CLINIC | Facility: CLINIC | Age: 53
End: 2023-06-19
Payer: COMMERCIAL

## 2023-06-19 VITALS
WEIGHT: 187 LBS | TEMPERATURE: 97.6 F | DIASTOLIC BLOOD PRESSURE: 76 MMHG | HEART RATE: 78 BPM | HEIGHT: 62 IN | SYSTOLIC BLOOD PRESSURE: 122 MMHG | BODY MASS INDEX: 34.41 KG/M2 | OXYGEN SATURATION: 98 %

## 2023-06-19 DIAGNOSIS — Z12.31 ENCOUNTER FOR SCREENING MAMMOGRAM FOR MALIGNANT NEOPLASM OF BREAST: ICD-10-CM

## 2023-06-19 DIAGNOSIS — R73.9 HYPERGLYCEMIA: ICD-10-CM

## 2023-06-19 DIAGNOSIS — G43.909 MIGRAINE WITHOUT STATUS MIGRAINOSUS, NOT INTRACTABLE, UNSPECIFIED MIGRAINE TYPE: ICD-10-CM

## 2023-06-19 DIAGNOSIS — E78.41 ELEVATED LIPOPROTEIN(A): Primary | ICD-10-CM

## 2023-06-19 DIAGNOSIS — M54.2 NECK PAIN: ICD-10-CM

## 2023-06-19 DIAGNOSIS — M25.512 ACUTE PAIN OF LEFT SHOULDER: ICD-10-CM

## 2023-06-19 PROCEDURE — 99214 OFFICE O/P EST MOD 30 MIN: CPT | Performed by: FAMILY MEDICINE

## 2023-06-19 RX ORDER — CYCLOBENZAPRINE HCL 5 MG
5 TABLET ORAL NIGHTLY PRN
Qty: 30 TABLET | Refills: 2 | Status: SHIPPED | OUTPATIENT
Start: 2023-06-19

## 2023-06-19 NOTE — PROGRESS NOTES
"Chief Complaint  Hyperlipidemia (Follow up with labs no complains ), Hyperglycemia (No complains ), and Migraine (3 days  with abnormal    migraine pain on  right side of skull  some of right )    Subjective        Adria Nogueira presents to [facility]  History of Present Illness  Pleasant 52-year-old female here to follow-up for hyperglycemia which is thankfully been stable, anemia which has slightly improved from prior, hyperlipidemia which is not well controlled, labs were done 3 months ago, last seen 1 year ago.    She has a L shoulder pain and numbness and was seen by ortho and did not want steroids and had an steroid injection and still locks.  Now her L shoulder pain has resolved.      She also has a R temple pulsating pain/ throbbing like it has a heart beat, that goes to the scalp and to her scalp.  It is severe in nature, lasted 3 days. Different pain from the migraine. No tears, no runny nose, no blurry vision     Objective   Vital Signs:  /76   Pulse 78   Temp 97.6 °F (36.4 °C)   Ht 157.5 cm (62\")   Wt 84.8 kg (187 lb)   SpO2 98%   BMI 34.20 kg/m²   Estimated body mass index is 34.2 kg/m² as calculated from the following:    Height as of this encounter: 157.5 cm (62\").    Weight as of this encounter: 84.8 kg (187 lb).             Physical Exam  Vitals and nursing note reviewed.   Constitutional:       General: She is not in acute distress.     Appearance: She is well-developed.   HENT:      Head: Normocephalic.      Nose: Nose normal.   Cardiovascular:      Rate and Rhythm: Normal rate and regular rhythm.      Heart sounds: Normal heart sounds. No murmur heard.  Pulmonary:      Effort: Pulmonary effort is normal. No respiratory distress.      Breath sounds: Normal breath sounds.   Musculoskeletal:         General: Normal range of motion.      Comments: Very tight muscles bilaterally, tight along the trapezius, neck, some tenderness to palpation diffusely over " the right lateral aspect of the head, no mass or other abnormality palpated.   Skin:     General: Skin is warm and dry.      Findings: No rash.   Neurological:      Mental Status: She is alert and oriented to person, place, and time.   Psychiatric:         Behavior: Behavior normal.         Thought Content: Thought content normal.         Judgment: Judgment normal.      Result Review :  The following data was reviewed by: Aura Hoskins MD on 06/19/2023:  CMP        3/10/2023    09:17   CMP   Glucose 96    BUN 13    Creatinine 0.70    Sodium 139    Potassium 4.0    Chloride 101    Calcium 9.6    Total Protein 6.8    Albumin 4.4    Globulin 2.4    Total Bilirubin 0.5    Alkaline Phosphatase 106    AST (SGOT) 20    ALT (SGPT) 14    BUN/Creatinine Ratio 18.6      CBC        3/10/2023    09:17   CBC   WBC 5.84    RBC 4.10    Hemoglobin 11.8    Hematocrit 35.8    MCV 87.3    MCH 28.8    MCHC 33.0    RDW 13.0    Platelets 282      Lipid Panel        3/10/2023    09:17   Lipid Panel   Total Cholesterol 251    Triglycerides 96    HDL Cholesterol 69    VLDL Cholesterol 17    LDL Cholesterol  165      TSH        3/10/2023    09:17   TSH   TSH 1.750      A1C Last 3 Results        3/10/2023    09:17   HGBA1C Last 3 Results   Hemoglobin A1C 5.90          The 10-year ASCVD risk score (Linh REINA, et al., 2019) is: 1.7%    Values used to calculate the score:      Age: 52 years      Sex: Female      Is Non- : No      Diabetic: No      Tobacco smoker: No      Systolic Blood Pressure: 122 mmHg      Is BP treated: No      HDL Cholesterol: 69 mg/dL      Total Cholesterol: 251 mg/dL           Assessment and Plan   Diagnoses and all orders for this visit:    1. Elevated lipoprotein(a) (Primary)    2. Hyperglycemia    3. Neck pain  -     Ambulatory Referral to Physical Therapy Evaluate and treat  -     cyclobenzaprine (FLEXERIL) 5 MG tablet; Take 1 tablet by mouth At Night As Needed for Muscle Spasms (Pain).   Dispense: 30 tablet; Refill: 2    4. Acute pain of left shoulder  -     Ambulatory Referral to Physical Therapy Evaluate and treat  -     cyclobenzaprine (FLEXERIL) 5 MG tablet; Take 1 tablet by mouth At Night As Needed for Muscle Spasms (Pain).  Dispense: 30 tablet; Refill: 2    5. Encounter for screening mammogram for malignant neoplasm of breast  -     Mammo Screening Digital Tomosynthesis Bilateral With CAD; Future    6. Migraine without status migrainosus, not intractable, unspecified migraine type  -     Ambulatory Referral to Physical Therapy Evaluate and treat  -     cyclobenzaprine (FLEXERIL) 5 MG tablet; Take 1 tablet by mouth At Night As Needed for Muscle Spasms (Pain).  Dispense: 30 tablet; Refill: 2    Pleasant 52-year-old female here to follow-up for hyperglycemia that is stable, hyperlipidemia that is also stable, I do think a lot of this is genetic, we discussed possibly increasing exercise but she has had significant musculoskeletal issues.    Left shoulder pain that came 3 weeks ago and has thankfully improved, some trigger finger on the left hand and severe right temple pain.  No corresponding symptoms of temporal arteritis, cluster headaches, it does not sound consistent with a different migraine headache for which she already has.  I think likely tension related/muscular.  I do think a regular exercise plan would help her substantially.  We will start with physical therapy and cyclobenzaprine to see if this will help loosen some of the muscles in the trapezius and neck which are very tight.    Assuming that she continues to improve plan to follow-up in 6 months.  However if she has worsening musculoskeletal pain or does not improve as anticipated she will see me sooner in 6 to 8 weeks.    She is in agreement with mammogram, declined colonoscopy.  We can reassess this with her next visit.  She also needs a Pap smear which I am happy to perform with her physical.       Follow Up   Return in about  6 months (around 12/19/2023), or if symptoms worsen or fail to improve, for Annual Physical with fasting labs prior with PAP .  Patient was given instructions and counseling regarding her condition or for health maintenance advice. Please see specific information pulled into the AVS if appropriate.     Aura Hoskins MD

## 2023-12-22 DIAGNOSIS — R73.9 HYPERGLYCEMIA: ICD-10-CM

## 2023-12-22 DIAGNOSIS — Z13.220 SCREENING, LIPID: ICD-10-CM

## 2023-12-22 DIAGNOSIS — Z13.0 SCREENING FOR DEFICIENCY ANEMIA: ICD-10-CM

## 2023-12-22 DIAGNOSIS — E55.9 AVITAMINOSIS D: ICD-10-CM

## 2023-12-22 DIAGNOSIS — E78.41 ELEVATED LIPOPROTEIN(A): Primary | ICD-10-CM

## 2023-12-22 DIAGNOSIS — Z13.6 SCREENING, HEART DISEASE, ISCHEMIC: ICD-10-CM

## 2024-01-02 ENCOUNTER — TELEPHONE (OUTPATIENT)
Dept: FAMILY MEDICINE CLINIC | Facility: CLINIC | Age: 54
End: 2024-01-02
Payer: COMMERCIAL

## 2024-01-02 NOTE — TELEPHONE ENCOUNTER
Patient and family concerned with COVID virus spreading. Patient and family have had COVID vaccinations with third booster. Patient is curious if there is anything else patient/family can take to help build immunity against virus?

## 2024-01-02 NOTE — TELEPHONE ENCOUNTER
This would be a great question for us to talk about at her upcoming visit.  In general exercising, staying healthy

## 2024-01-03 LAB
25(OH)D3+25(OH)D2 SERPL-MCNC: 19.8 NG/ML (ref 30–100)
ALBUMIN SERPL-MCNC: 4 G/DL (ref 3.5–5.2)
ALBUMIN/GLOB SERPL: 1.5 G/DL
ALP SERPL-CCNC: 89 U/L (ref 39–117)
ALT SERPL-CCNC: 24 U/L (ref 1–33)
AST SERPL-CCNC: 18 U/L (ref 1–32)
BASOPHILS # BLD AUTO: 0.07 10*3/MM3 (ref 0–0.2)
BASOPHILS NFR BLD AUTO: 0.9 % (ref 0–1.5)
BILIRUB SERPL-MCNC: 0.3 MG/DL (ref 0–1.2)
BUN SERPL-MCNC: 13 MG/DL (ref 6–20)
BUN/CREAT SERPL: 19.4 (ref 7–25)
CALCIUM SERPL-MCNC: 9.3 MG/DL (ref 8.6–10.5)
CHLORIDE SERPL-SCNC: 103 MMOL/L (ref 98–107)
CHOLEST SERPL-MCNC: 258 MG/DL (ref 0–200)
CO2 SERPL-SCNC: 27.2 MMOL/L (ref 22–29)
CREAT SERPL-MCNC: 0.67 MG/DL (ref 0.57–1)
EGFRCR SERPLBLD CKD-EPI 2021: 103.4 ML/MIN/1.73
EOSINOPHIL # BLD AUTO: 0.1 10*3/MM3 (ref 0–0.4)
EOSINOPHIL NFR BLD AUTO: 1.3 % (ref 0.3–6.2)
ERYTHROCYTE [DISTWIDTH] IN BLOOD BY AUTOMATED COUNT: 13.1 % (ref 12.3–15.4)
GLOBULIN SER CALC-MCNC: 2.6 GM/DL
GLUCOSE SERPL-MCNC: 93 MG/DL (ref 65–99)
HBA1C MFR BLD: 5.7 % (ref 4.8–5.6)
HCT VFR BLD AUTO: 35.4 % (ref 34–46.6)
HDLC SERPL-MCNC: 60 MG/DL (ref 40–60)
HGB BLD-MCNC: 12 G/DL (ref 12–15.9)
IMM GRANULOCYTES # BLD AUTO: 0.02 10*3/MM3 (ref 0–0.05)
IMM GRANULOCYTES NFR BLD AUTO: 0.3 % (ref 0–0.5)
LDLC SERPL CALC-MCNC: 179 MG/DL (ref 0–100)
LDLC/HDLC SERPL: 2.94 {RATIO}
LYMPHOCYTES # BLD AUTO: 3.01 10*3/MM3 (ref 0.7–3.1)
LYMPHOCYTES NFR BLD AUTO: 39.4 % (ref 19.6–45.3)
MCH RBC QN AUTO: 30.2 PG (ref 26.6–33)
MCHC RBC AUTO-ENTMCNC: 33.9 G/DL (ref 31.5–35.7)
MCV RBC AUTO: 88.9 FL (ref 79–97)
MONOCYTES # BLD AUTO: 0.61 10*3/MM3 (ref 0.1–0.9)
MONOCYTES NFR BLD AUTO: 8 % (ref 5–12)
NEUTROPHILS # BLD AUTO: 3.82 10*3/MM3 (ref 1.7–7)
NEUTROPHILS NFR BLD AUTO: 50.1 % (ref 42.7–76)
NRBC BLD AUTO-RTO: 0 /100 WBC (ref 0–0.2)
PLATELET # BLD AUTO: 280 10*3/MM3 (ref 140–450)
POTASSIUM SERPL-SCNC: 4.2 MMOL/L (ref 3.5–5.2)
PROT SERPL-MCNC: 6.6 G/DL (ref 6–8.5)
RBC # BLD AUTO: 3.98 10*6/MM3 (ref 3.77–5.28)
SODIUM SERPL-SCNC: 140 MMOL/L (ref 136–145)
TRIGL SERPL-MCNC: 108 MG/DL (ref 0–150)
TSH SERPL DL<=0.005 MIU/L-ACNC: 3.03 UIU/ML (ref 0.27–4.2)
VLDLC SERPL CALC-MCNC: 19 MG/DL (ref 5–40)
WBC # BLD AUTO: 7.63 10*3/MM3 (ref 3.4–10.8)

## 2024-01-08 ENCOUNTER — OFFICE VISIT (OUTPATIENT)
Dept: FAMILY MEDICINE CLINIC | Facility: CLINIC | Age: 54
End: 2024-01-08
Payer: COMMERCIAL

## 2024-01-08 VITALS
DIASTOLIC BLOOD PRESSURE: 74 MMHG | OXYGEN SATURATION: 98 % | HEART RATE: 76 BPM | BODY MASS INDEX: 33.49 KG/M2 | SYSTOLIC BLOOD PRESSURE: 118 MMHG | TEMPERATURE: 97.6 F | HEIGHT: 62 IN | WEIGHT: 182 LBS

## 2024-01-08 DIAGNOSIS — E78.41 ELEVATED LIPOPROTEIN(A): ICD-10-CM

## 2024-01-08 DIAGNOSIS — R73.9 HYPERGLYCEMIA: ICD-10-CM

## 2024-01-08 DIAGNOSIS — Z12.11 SCREEN FOR COLON CANCER: ICD-10-CM

## 2024-01-08 DIAGNOSIS — Z23 NEED FOR VACCINATION: ICD-10-CM

## 2024-01-08 DIAGNOSIS — Z00.00 HEALTH MAINTENANCE EXAMINATION: Primary | ICD-10-CM

## 2024-01-08 PROCEDURE — 99396 PREV VISIT EST AGE 40-64: CPT | Performed by: FAMILY MEDICINE

## 2024-01-08 PROCEDURE — 90471 IMMUNIZATION ADMIN: CPT | Performed by: FAMILY MEDICINE

## 2024-01-08 PROCEDURE — 90686 IIV4 VACC NO PRSV 0.5 ML IM: CPT | Performed by: FAMILY MEDICINE
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
## 2024-01-08 NOTE — PROGRESS NOTES
"Chief Complaint  Annual Exam (Had a fall  and on left side shoulder back and left knee still feels sore )    Heraclio Nogueira presents to [facility]  History of Present Illness  Annual Exam.    Health Maintenance   Topic Date Due    COLORECTAL CANCER SCREENING  Never done    PAP SMEAR  Never done    ANNUAL PHYSICAL  06/21/2022    INFLUENZA VACCINE  08/01/2023    COVID-19 Vaccine (4 - 2023-24 season) 09/01/2023    ZOSTER VACCINE (1 of 2) 06/15/2028 (Originally 7/30/2018)    BMI FOLLOWUP  06/19/2024    LIPID PANEL  01/02/2025    MAMMOGRAM  07/12/2025    TDAP/TD VACCINES (2 - Td or Tdap) 06/22/2029    HEPATITIS C SCREENING  Completed    Pneumococcal Vaccine 0-64  Aged Out     Diet: Enjoys veggies, eats healthy foods from scratch - lots of salads  Exercise: Doing PT, her back pain I a chronic problem now and gets regino with vacuuming.  She is working less.   GYN: Due for PAP   Immunizations: Due for flue   Colon cancer screening: Due   Sleep/mental health: some stress with work, no depression - but she gets worried. Maryjo with her husbands health.        Fall: Occurred October 31, 2023. She is going to [facility]. She fell on her left side and hit her shoulder and back to, still with some left knee pain.  Jeremy Occupational doctor is managing - workman's comp.     Objective   Vital Signs:  /74   Pulse 76   Temp 97.6 °F (36.4 °C)   Ht 157.5 cm (62\")   Wt 82.6 kg (182 lb)   SpO2 98%   BMI 33.29 kg/m²   Estimated body mass index is 33.29 kg/m² as calculated from the following:    Height as of this encounter: 157.5 cm (62\").    Weight as of this encounter: 82.6 kg (182 lb).       Physical Exam  Vitals and nursing note reviewed.   Constitutional:       General: She is not in acute distress.     Appearance: Normal appearance. She is well-developed.   HENT:      Head: Normocephalic.      Right Ear: Tympanic membrane and ear canal normal. There is no impacted cerumen.      Left " Ear: Tympanic membrane and ear canal normal. There is no impacted cerumen.      Nose: Nose normal.   Eyes:      Conjunctiva/sclera: Conjunctivae normal.      Pupils: Pupils are equal, round, and reactive to light.   Neck:      Vascular: No carotid bruit.   Cardiovascular:      Rate and Rhythm: Normal rate and regular rhythm.      Heart sounds: Normal heart sounds. No murmur heard.  Pulmonary:      Effort: Pulmonary effort is normal. No respiratory distress.      Breath sounds: Normal breath sounds.   Abdominal:      General: Abdomen is flat. Bowel sounds are normal. There is no distension.      Tenderness: There is no abdominal tenderness.   Musculoskeletal:         General: Normal range of motion.   Skin:     General: Skin is warm and dry.      Findings: No rash.   Neurological:      Mental Status: She is alert and oriented to person, place, and time.   Psychiatric:         Behavior: Behavior normal.         Thought Content: Thought content normal.         Judgment: Judgment normal.        Result Review :  The following data was reviewed by: Aura Hoskins MD on 01/08/2024:      Vitamin D,25-Hydroxy (01/02/2024 08:32)  CBC & Differential (01/02/2024 08:32)  TSH Rfx On Abnormal To Free T4 (01/02/2024 08:32)  Lipid Panel With LDL / HDL Ratio (01/02/2024 08:32)  Comprehensive Metabolic Panel (01/02/2024 08:32)  Hemoglobin A1c (01/02/2024 08:32)    The 10-year ASCVD risk score (Linh REINA, et al., 2019) is: 2%    Values used to calculate the score:      Age: 53 years      Sex: Female      Is Non- : No      Diabetic: No      Tobacco smoker: No      Systolic Blood Pressure: 118 mmHg      Is BP treated: No      HDL Cholesterol: 60 mg/dL      Total Cholesterol: 258 mg/dL           Assessment and Plan   Diagnoses and all orders for this visit:    1. Health maintenance examination (Primary)    2. Screen for colon cancer  -     Cologuard - Stool, Per Rectum; Future    3. Hyperglycemia  -     Hemoglobin  A1c; Future    4. Elevated lipoprotein(a)  -     Comprehensive Metabolic Panel; Future  -     CBC & Differential; Future  -     Lipid Panel; Future      Here for annual exam, fasting labs reviewed with patient, immunizations up-to-date, colon cancer screening ordered as above, GYN health recommended, gave names for her to call, cardiovascular screening up-to-date, counseled patient to increase vegetable intake, water and 150 minutes of exercise weekly combining weightbearing exercises and aerobic activity.    I am happy to continue restrictions of not lifting more than 5 pounds for work.         Follow Up   Return in 6 months (on 7/8/2024), or if symptoms worsen or fail to improve, for Recheck hyperglycemia with labs prior.  Patient was given instructions and counseling regarding her condition or for health maintenance advice. Please see specific information pulled into the AVS if appropriate.

## 2024-01-20 ENCOUNTER — HOSPITAL ENCOUNTER (EMERGENCY)
Facility: HOSPITAL | Age: 54
Discharge: HOME | End: 2024-01-20
Attending: EMERGENCY MEDICINE | Admitting: EMERGENCY MEDICINE
Payer: COMMERCIAL

## 2024-01-20 ENCOUNTER — APPOINTMENT (OUTPATIENT)
Dept: CT IMAGING | Facility: HOSPITAL | Age: 54
End: 2024-01-20
Payer: COMMERCIAL

## 2024-01-20 VITALS
BODY MASS INDEX: 33.86 KG/M2 | DIASTOLIC BLOOD PRESSURE: 80 MMHG | WEIGHT: 184 LBS | RESPIRATION RATE: 18 BRPM | HEIGHT: 62 IN | TEMPERATURE: 97.7 F | SYSTOLIC BLOOD PRESSURE: 120 MMHG | OXYGEN SATURATION: 100 % | HEART RATE: 70 BPM

## 2024-01-20 DIAGNOSIS — R10.13 EPIGASTRIC ABDOMINAL PAIN: ICD-10-CM

## 2024-01-20 DIAGNOSIS — R10.11 RIGHT UPPER QUADRANT ABDOMINAL PAIN: Primary | ICD-10-CM

## 2024-01-20 DIAGNOSIS — R11.0 NAUSEA: ICD-10-CM

## 2024-01-20 LAB
ALBUMIN SERPL-MCNC: 3.9 G/DL (ref 3.5–5.2)
ALBUMIN/GLOB SERPL: 1.6 G/DL
ALP SERPL-CCNC: 91 U/L (ref 39–117)
ALT SERPL W P-5'-P-CCNC: 14 U/L (ref 1–33)
ANION GAP SERPL CALCULATED.3IONS-SCNC: 6.7 MMOL/L (ref 5–15)
AST SERPL-CCNC: 17 U/L (ref 1–32)
BACTERIA UR QL AUTO: NORMAL /HPF
BASOPHILS # BLD AUTO: 0.05 10*3/MM3 (ref 0–0.2)
BASOPHILS NFR BLD AUTO: 0.8 % (ref 0–1.5)
BILIRUB SERPL-MCNC: <0.2 MG/DL (ref 0–1.2)
BILIRUB UR QL STRIP: NEGATIVE
BUN SERPL-MCNC: 13 MG/DL (ref 6–20)
BUN/CREAT SERPL: 20.3 (ref 7–25)
CALCIUM SPEC-SCNC: 9.1 MG/DL (ref 8.6–10.5)
CHLORIDE SERPL-SCNC: 103 MMOL/L (ref 98–107)
CLARITY UR: CLEAR
CO2 SERPL-SCNC: 28.3 MMOL/L (ref 22–29)
COLOR UR: YELLOW
CREAT SERPL-MCNC: 0.64 MG/DL (ref 0.57–1)
DEPRECATED RDW RBC AUTO: 44.8 FL (ref 37–54)
EGFRCR SERPLBLD CKD-EPI 2021: 104.5 ML/MIN/1.73
EOSINOPHIL # BLD AUTO: 0.12 10*3/MM3 (ref 0–0.4)
EOSINOPHIL NFR BLD AUTO: 1.8 % (ref 0.3–6.2)
ERYTHROCYTE [DISTWIDTH] IN BLOOD BY AUTOMATED COUNT: 13 % (ref 12.3–15.4)
GLOBULIN UR ELPH-MCNC: 2.4 GM/DL
GLUCOSE SERPL-MCNC: 98 MG/DL (ref 65–99)
GLUCOSE UR STRIP-MCNC: NEGATIVE MG/DL
HCG SERPL QL: NEGATIVE
HCT VFR BLD AUTO: 35.8 % (ref 34–46.6)
HGB BLD-MCNC: 11.4 G/DL (ref 12–15.9)
HGB UR QL STRIP.AUTO: ABNORMAL
HOLD SPECIMEN: NORMAL
HYALINE CASTS UR QL AUTO: NORMAL /LPF
IMM GRANULOCYTES # BLD AUTO: 0.02 10*3/MM3 (ref 0–0.05)
IMM GRANULOCYTES NFR BLD AUTO: 0.3 % (ref 0–0.5)
KETONES UR QL STRIP: NEGATIVE
LEUKOCYTE ESTERASE UR QL STRIP.AUTO: NEGATIVE
LIPASE SERPL-CCNC: 46 U/L (ref 13–60)
LYMPHOCYTES # BLD AUTO: 2.51 10*3/MM3 (ref 0.7–3.1)
LYMPHOCYTES NFR BLD AUTO: 38.3 % (ref 19.6–45.3)
MCH RBC QN AUTO: 29.7 PG (ref 26.6–33)
MCHC RBC AUTO-ENTMCNC: 31.8 G/DL (ref 31.5–35.7)
MCV RBC AUTO: 93.2 FL (ref 79–97)
MONOCYTES # BLD AUTO: 0.48 10*3/MM3 (ref 0.1–0.9)
MONOCYTES NFR BLD AUTO: 7.3 % (ref 5–12)
NEUTROPHILS NFR BLD AUTO: 3.37 10*3/MM3 (ref 1.7–7)
NEUTROPHILS NFR BLD AUTO: 51.5 % (ref 42.7–76)
NITRITE UR QL STRIP: NEGATIVE
PH UR STRIP.AUTO: 7 [PH] (ref 5–8)
PLATELET # BLD AUTO: 266 10*3/MM3 (ref 140–450)
PMV BLD AUTO: 10.9 FL (ref 6–12)
POTASSIUM SERPL-SCNC: 4 MMOL/L (ref 3.5–5.2)
PROT SERPL-MCNC: 6.3 G/DL (ref 6–8.5)
PROT UR QL STRIP: NEGATIVE
RBC # BLD AUTO: 3.84 10*6/MM3 (ref 3.77–5.28)
RBC # UR STRIP: NORMAL /HPF
REF LAB TEST METHOD: NORMAL
SODIUM SERPL-SCNC: 138 MMOL/L (ref 136–145)
SP GR UR STRIP: 1.01 (ref 1–1.03)
SQUAMOUS #/AREA URNS HPF: NORMAL /HPF
TROPONIN T SERPL HS-MCNC: 7 NG/L
UROBILINOGEN UR QL STRIP: ABNORMAL
WBC # UR STRIP: NORMAL /HPF
WBC NRBC COR # BLD AUTO: 6.55 10*3/MM3 (ref 3.4–10.8)

## 2024-01-20 PROCEDURE — 81001 URINALYSIS AUTO W/SCOPE: CPT | Performed by: EMERGENCY MEDICINE

## 2024-01-20 PROCEDURE — 83690 ASSAY OF LIPASE: CPT | Performed by: EMERGENCY MEDICINE

## 2024-01-20 PROCEDURE — 99284 EMERGENCY DEPT VISIT MOD MDM: CPT | Performed by: PHYSICIAN ASSISTANT

## 2024-01-20 PROCEDURE — 84703 CHORIONIC GONADOTROPIN ASSAY: CPT | Performed by: PHYSICIAN ASSISTANT

## 2024-01-20 PROCEDURE — 99285 EMERGENCY DEPT VISIT HI MDM: CPT

## 2024-01-20 PROCEDURE — 84484 ASSAY OF TROPONIN QUANT: CPT | Performed by: EMERGENCY MEDICINE

## 2024-01-20 PROCEDURE — 74177 CT ABD & PELVIS W/CONTRAST: CPT

## 2024-01-20 PROCEDURE — 25510000001 IOPAMIDOL PER 1 ML: Performed by: EMERGENCY MEDICINE

## 2024-01-20 PROCEDURE — 80053 COMPREHEN METABOLIC PANEL: CPT | Performed by: EMERGENCY MEDICINE

## 2024-01-20 PROCEDURE — 85025 COMPLETE CBC W/AUTO DIFF WBC: CPT | Performed by: EMERGENCY MEDICINE

## 2024-01-20 PROCEDURE — 36415 COLL VENOUS BLD VENIPUNCTURE: CPT

## 2024-01-20 RX ORDER — ONDANSETRON 4 MG/1
4 TABLET, ORALLY DISINTEGRATING ORAL EVERY 6 HOURS PRN
Qty: 15 TABLET | Refills: 0 | Status: SHIPPED | OUTPATIENT
Start: 2024-01-20 | End: 2024-01-25

## 2024-01-20 RX ORDER — OMEPRAZOLE 20 MG/1
20 CAPSULE, DELAYED RELEASE ORAL 2 TIMES DAILY
Qty: 14 CAPSULE | Refills: 0 | Status: SHIPPED | OUTPATIENT
Start: 2024-01-20 | End: 2024-01-27

## 2024-01-20 RX ORDER — SODIUM CHLORIDE 0.9 % (FLUSH) 0.9 %
10 SYRINGE (ML) INJECTION AS NEEDED
Status: DISCONTINUED | OUTPATIENT
Start: 2024-01-20 | End: 2024-01-20 | Stop reason: HOSPADM

## 2024-01-20 RX ADMIN — IOPAMIDOL 85 ML: 755 INJECTION, SOLUTION INTRAVENOUS at 18:17

## 2024-01-20 NOTE — FSED PROVIDER NOTE
Subjective   History of Present Illness    Patient is complaining of right upper quadrant and right lower quadrant abdominal pain which started 5 days ago.  She describes the pain as constant and sharp.  Rates it a 5 out of 10 pain scale.  Denies any history of abdominal surgeries aside  section several years ago.  Denies fever, dysuria, hematuria, urinary frequency or urgency.  Patient was seen at urgent care prior to arrival and was advised to come here for further evaluation on abdominal pain.  Associated symptoms include nausea, denies vomiting.    Review of Systems   Constitutional:  Negative for activity change and appetite change.   Eyes:  Negative for pain.   Respiratory:  Negative for shortness of breath.    Gastrointestinal:  Positive for abdominal pain. Negative for nausea and vomiting.   Musculoskeletal:  Negative for arthralgias.   Skin:  Negative for color change.   Neurological:  Negative for dizziness.   All other systems reviewed and are negative.      Past Medical History:   Diagnosis Date    Anal fissure     Back pain 2018    Migraines        Allergies   Allergen Reactions    Aspirin Nausea Only    Ibuprofen Nausea Only       Past Surgical History:   Procedure Laterality Date     SECTION      KNEE ARTHROSCOPY Right        Family History   Problem Relation Age of Onset    Asthma Mother     Other Father         brain hemorhage in his 80's, now, as of 2017, is alive age 100    Cerebral aneurysm Father     No Known Problems Sister     Hypertension Brother     No Known Problems Sister     No Known Problems Sister     No Known Problems Sister     No Known Problems Sister     No Known Problems Brother     No Known Problems Brother     No Known Problems Brother     No Known Problems Brother     No Known Problems Brother     No Known Problems Brother     No Known Problems Brother     No Known Problems Brother     Cancer Neg Hx     Heart attack Neg Hx     Stroke Neg Hx     Diabetes  Neg Hx        Social History     Socioeconomic History    Marital status:    Tobacco Use    Smoking status: Never    Smokeless tobacco: Never   Vaping Use    Vaping Use: Never used   Substance and Sexual Activity    Alcohol use: No    Drug use: No    Sexual activity: Not Currently     Partners: Male     Comment:            Objective   Physical Exam  Vitals and nursing note reviewed.   Constitutional:       Appearance: Normal appearance. She is normal weight.   HENT:      Head: Normocephalic and atraumatic.      Nose: Nose normal.      Mouth/Throat:      Mouth: Mucous membranes are moist.   Eyes:      Pupils: Pupils are equal, round, and reactive to light.   Cardiovascular:      Rate and Rhythm: Normal rate and regular rhythm.      Pulses: Normal pulses.      Heart sounds: Normal heart sounds.   Pulmonary:      Effort: Pulmonary effort is normal.      Breath sounds: Normal breath sounds.   Abdominal:      Tenderness:  in the right upper quadrant and right lower quadrant There is no guarding or rebound.   Musculoskeletal:         General: Normal range of motion.      Cervical back: Normal range of motion.      Right lower leg: No edema.      Left lower leg: No edema.   Skin:     General: Skin is warm.   Neurological:      General: No focal deficit present.      Mental Status: She is alert.   Psychiatric:         Behavior: Behavior is cooperative.         Procedures           ED Course  ED Course as of 01/20/24 1918   Sat Jan 20, 2024   1649 Offered patient nausea and pain medication at this time, she reports that she does not want any medications. [SS]   1906 Hemoglobin(!): 11.4  Baseline for patient [SS]      ED Course User Index  [SS] Mary Vance PA-C                                           Medical Decision Making  Problems Addressed:  Epigastric abdominal pain: complicated acute illness or injury  Nausea: complicated acute illness or injury  Right upper quadrant abdominal pain: complicated  acute illness or injury    Amount and/or Complexity of Data Reviewed  Labs: ordered. Decision-making details documented in ED Course.  Radiology: ordered.    Risk  Prescription drug management.        Final diagnoses:   None       ED Disposition  ED Disposition       None            No follow-up provider specified.       Medication List      No changes were made to your prescriptions during this visit.

## 2024-01-21 NOTE — DISCHARGE INSTRUCTIONS
If your abdominal pain continues, advise a follow-up with Dr. Morton, GI.  [place] diet for the next few days.  Take medications as prescribed.  Follow-up with your primary care doctor next week if your symptoms continue.

## 2024-03-05 ENCOUNTER — TELEPHONE (OUTPATIENT)
Dept: FAMILY MEDICINE CLINIC | Facility: CLINIC | Age: 54
End: 2024-03-05
Payer: COMMERCIAL

## 2024-03-05 NOTE — TELEPHONE ENCOUNTER
I have approved letters in the past, I was looking back through them and they do not mention specific accommodations.    I am happy to write a letter that says that she is not to exceed 8 hours of work, lifting, twisting    Either the think she needs dated or restricted with her job duties that makes her back pain worse?

## 2024-03-05 NOTE — TELEPHONE ENCOUNTER
Pt would like a letter from dr Hoskins  for work with accommodations due to her issues with the back pain  ,I asked if her job place will send us a  form but she states not is just a letter what she needs is this ok or does she needs apt ?

## 2024-03-05 NOTE — TELEPHONE ENCOUNTER
Do you mind drafting a letter for patient stating that it is my medical opinion that due to back pain she does need work restrictions of not lifting twisting or standing longer than 2 hours without sitting.

## 2024-03-05 NOTE — TELEPHONE ENCOUNTER
Patient called to check status of letter, has letter been created? Need any help? Please advise.      duties are more demanding with standing long hours, twisting and turning, scanning and lifting heavy items, along with bagging. Causes back and wrist pain.   Self check out offers less physical stresses.

## 2024-03-07 NOTE — TELEPHONE ENCOUNTER
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

## 2024-07-09 DIAGNOSIS — Z13.6 SCREENING, HEART DISEASE, ISCHEMIC: ICD-10-CM

## 2024-07-09 DIAGNOSIS — Z13.0 SCREENING FOR DEFICIENCY ANEMIA: ICD-10-CM

## 2024-07-09 DIAGNOSIS — R73.9 HYPERGLYCEMIA: Primary | ICD-10-CM

## 2024-07-09 DIAGNOSIS — E78.41 ELEVATED LIPOPROTEIN(A): ICD-10-CM

## 2025-08-01 ENCOUNTER — OFFICE VISIT (OUTPATIENT)
Dept: FAMILY MEDICINE CLINIC | Facility: CLINIC | Age: 55
End: 2025-08-01
Payer: COMMERCIAL

## 2025-08-01 VITALS
WEIGHT: 179 LBS | BODY MASS INDEX: 32.94 KG/M2 | OXYGEN SATURATION: 99 % | HEIGHT: 62 IN | DIASTOLIC BLOOD PRESSURE: 78 MMHG | HEART RATE: 78 BPM | TEMPERATURE: 97.8 F | SYSTOLIC BLOOD PRESSURE: 112 MMHG

## 2025-08-01 DIAGNOSIS — G89.29 CHRONIC PAIN OF LEFT KNEE: Primary | ICD-10-CM

## 2025-08-01 DIAGNOSIS — M54.42 CHRONIC LEFT-SIDED LOW BACK PAIN WITH LEFT-SIDED SCIATICA: ICD-10-CM

## 2025-08-01 DIAGNOSIS — E78.41 ELEVATED LIPOPROTEIN(A): ICD-10-CM

## 2025-08-01 DIAGNOSIS — E55.9 AVITAMINOSIS D: ICD-10-CM

## 2025-08-01 DIAGNOSIS — M25.562 CHRONIC PAIN OF LEFT KNEE: Primary | ICD-10-CM

## 2025-08-01 DIAGNOSIS — R73.9 HYPERGLYCEMIA: ICD-10-CM

## 2025-08-01 DIAGNOSIS — G89.29 CHRONIC LEFT-SIDED LOW BACK PAIN WITH LEFT-SIDED SCIATICA: ICD-10-CM

## 2025-08-01 PROCEDURE — 99214 OFFICE O/P EST MOD 30 MIN: CPT | Performed by: FAMILY MEDICINE

## 2025-08-01 NOTE — PROGRESS NOTES
Chief Complaint  Leg Pain (Left leg pain from hip  to  the knee   has pain discomfort  some feet burn   as well  not sure if uric acid is elevated )    Heraclio Nogueira presents to [facility]  History of Present Illness    History of Present Illness  The patient presents with left knee pain, back pain, and recent severe right foot pain.  She has also been lost to follow-up for 20 months, discussed she is delinquent on how her labs,.  She also needs follow-up for hyperlipidemia, hyperglycemia and anemia.    Left Knee Pain  Suspected to be related to lawn mowing activities, exacerbated by bending or movement, causing reduced range of motion. Interested in imaging (x-ray/MRI). Uses turmeric in cooking but not specifically for pain relief.  - Onset: Related to lawn mowing activities.  - Location: Left knee.  - Character: Exacerbated by bending or movement, causing reduced range of motion.  - Alleviating/Aggravating Factors: Exacerbated by bending or movement.  - Severity: Interested in imaging (x-ray/MRI).    Back Pain  Persistent for 1 year and 4 months following an injury. Previously evaluated by Dr. Garland (spine specialist) with x-ray showing worsening condition and MRI performed at [facility]. Discontinued meloxicam, muscle relaxers, and prednisone due to stomach ulcers; currently uses Aleve PRN. Attended 4-5 physical therapy sessions but no recent therapy. Significant stress reported, but blood pressure remains stable.  - Onset: Following an injury 1 year and 4 months ago.  - Duration: Persistent for 1 year and 4 months.  - Character: Worsening condition as shown by x-ray and MRI.  - Alleviating/Aggravating Factors: Discontinued meloxicam, muscle relaxers, and prednisone due to stomach ulcers; currently uses Aleve PRN.  - Timing: Attended 4-5 physical therapy sessions but no recent therapy.  - Severity: Significant stress reported, but blood pressure remains  "stable.    Right Foot Pain  Recent episode of severe pain described as poking and burning, disrupting sleep.  - Onset: Recent episode.  - Location: Right foot.  - Character: Severe pain described as poking and burning.  - Timing: Disrupting sleep.    MEDICATIONS  Current: Aleve PRN.  Discontinued: Meloxicam, prednisone, Tylenol.       Objective   Vital Signs:  /78   Pulse 78   Temp 97.8 °F (36.6 °C)   Ht 157.5 cm (62\")   Wt 81.2 kg (179 lb)   SpO2 99%   BMI 32.74 kg/m²   Estimated body mass index is 32.74 kg/m² as calculated from the following:    Height as of this encounter: 157.5 cm (62\").    Weight as of this encounter: 81.2 kg (179 lb).          Physical Exam  Vitals and nursing note reviewed.   Constitutional:       General: She is not in acute distress.     Appearance: She is well-developed.   HENT:      Head: Normocephalic.      Nose: Nose normal.   Cardiovascular:      Rate and Rhythm: Normal rate and regular rhythm.      Heart sounds: Normal heart sounds. No murmur heard.  Pulmonary:      Effort: Pulmonary effort is normal. No respiratory distress.      Breath sounds: Normal breath sounds.   Musculoskeletal:         General: Normal range of motion.      Comments: No pain to palpation along the lumbar spine but most tender along the gluteal region on the left side    Left knee with lateral knee tenderness and some inferior to the patella, good range of motion no laxity, neurovascular intact   Skin:     General: Skin is warm and dry.      Findings: No rash.   Neurological:      Mental Status: She is alert and oriented to person, place, and time.   Psychiatric:         Behavior: Behavior normal.         Thought Content: Thought content normal.         Judgment: Judgment normal.            Result Review :  The following data was reviewed by: Aura Hoskins MD on 08/01/2025:           Reviewed last note from Dr. Dada Paz February 5, 2025.  Reviewed MRI imaging and recommendations for pain " management    A left knee X-Ray 3 view with sunrise view was ordered. My reading of this film is normal joint spaces, no acute signs of fracture or dislocation.  Normal knee.  Small osteophyte on the lateral edge of the patella which looks possibly like a tendinitis.  (No comparison films available: pending review by Radiologist.)       Assessment and Plan   Diagnoses and all orders for this visit:    1. Chronic pain of left knee (Primary)  -     Uric Acid  -     Ambulatory Referral to Physical Therapy for Evaluation & Treatment  -     XR Knee 3 View Left    2. Elevated lipoprotein(a)  -     Comprehensive Metabolic Panel  -     Lipid Panel With LDL / HDL Ratio  -     TSH Rfx On Abnormal To Free T4  -     CBC & Differential    3. Hyperglycemia  -     Hemoglobin A1c    4. Avitaminosis D  -     Vitamin D,25-Hydroxy    5. Chronic left-sided low back pain with left-sided sciatica  -     Ambulatory Referral to Physical Therapy for Evaluation & Treatment           Assessment & Plan  1. Left knee pain, chronic problem that is not well-controlled, seems to be worst as she is walking or standing.  She does get improvement with Aleve  - Likely arthritis or other etiology  - Order x-ray today  - Refer to PT for knee and back  - Further treatment based on imaging results    2. Back pain, chronic problem that has mostly been right-sided but with a recent back injury at work her symptoms have now been on the left side which is new  - Order records for lumbar spine MRI in 2024  - Refer to PT  - Recommend water exercises  - Consider pain management referral if no improvement  - Follow-up in 6 weeks    3. Right foot pain  - Possibly related to back issues  - Monitor during PT for knee/back  - Add foot to PT orders if needed    4.  Hyperlipidemia and hyperglycemia have not been assessed since January 2024, due for labs we can discuss this further at her next appointment    Follow-up  - Schedule in 1 month to assess  progress      Follow Up   Return in about 6 weeks (around 9/12/2025), or if symptoms worsen or fail to improve, for Recheck lower back and review labs .  Patient was given instructions and counseling regarding her condition or for health maintenance advice. Please see specific information pulled into the AVS if appropriate.         Aura Hoskins MD      Patient or patient representative verbalized consent for the use of Ambient Listening during the visit with  Aura Hoskins MD for chart documentation. 8/1/2025  14:59 EDT